# Patient Record
Sex: FEMALE | NOT HISPANIC OR LATINO | Employment: FULL TIME | URBAN - NONMETROPOLITAN AREA
[De-identification: names, ages, dates, MRNs, and addresses within clinical notes are randomized per-mention and may not be internally consistent; named-entity substitution may affect disease eponyms.]

---

## 2019-01-08 ENCOUNTER — OFFICE VISIT CONVERTED (OUTPATIENT)
Dept: FAMILY MEDICINE CLINIC | Age: 44
End: 2019-01-08
Attending: NURSE PRACTITIONER

## 2019-01-16 ENCOUNTER — HOSPITAL ENCOUNTER (OUTPATIENT)
Dept: OTHER | Facility: HOSPITAL | Age: 44
Discharge: HOME | End: 2019-01-16
Attending: NURSE PRACTITIONER

## 2019-01-16 ENCOUNTER — OFFICE VISIT CONVERTED (OUTPATIENT)
Dept: FAMILY MEDICINE CLINIC | Age: 44
End: 2019-01-16
Attending: NURSE PRACTITIONER

## 2019-01-18 LAB
ALP SERPL-CCNC: 91 U/L (ref 42–98)
ASO AB SERPL-ACNC: 194 [IU]/ML (ref 0–200)
CALCIUM SERPL-MCNC: 9.3 MG/DL (ref 8.7–10.4)
CONV ANTI NUCLEAR ANTIBODY WITH REFLEX: POSITIVE
CONV RHEUMATOID FACTOR IGM: <10 [IU]/ML (ref 0–14)
CRP SERPL-MCNC: 4 MG/L (ref 0–5)
ERYTHROCYTE [SEDIMENTATION RATE] IN BLOOD: 6 MM/H (ref 0–20)
PHOSPHATE SERPL-MCNC: 3.4 MG/DL (ref 2.4–4.5)
URATE SERPL-MCNC: 4.3 MG/DL (ref 2.5–7.5)

## 2019-01-21 LAB
CENTROMERE B AB SER-ACNC: <0.2 AI (ref 0–0.9)
CHROMATIN AB: <0.2 AI (ref 0–0.9)
CONV ANTI DOUBLE STRAND DNA  (REFLEX): <1 IU/ML (ref 0–9)
CONV SS-A ANTIBODY (REFLEX): <0.2 AI (ref 0–0.9)
CONV SS-B ANTIBODY (REFLEX): <0.2 AI (ref 0–0.9)
ENA JO1 AB SER IA-ACNC: <0.2 AI (ref 0–0.9)
ENA RNP AB SER-ACNC: <0.2 AI (ref 0–0.9)
ENA SCL70 AB SER QL IA: 3.8 AI (ref 0–0.9)
ENA SM AB SER-ACNC: <0.2 AI (ref 0–0.9)
Lab: ABNORMAL

## 2019-03-08 ENCOUNTER — HOSPITAL ENCOUNTER (OUTPATIENT)
Dept: OTHER | Facility: HOSPITAL | Age: 44
Discharge: HOME | End: 2019-03-08
Attending: INTERNAL MEDICINE

## 2019-03-08 LAB
ERYTHROCYTE [SEDIMENTATION RATE] IN BLOOD: 15 MM/H (ref 0–20)
TSH SERPL-ACNC: 2.11 M[IU]/L (ref 0.27–4.2)

## 2019-03-10 LAB
C3 SERPL-MCNC: 132 MG/DL (ref 88–201)
C4 SERPL-MCNC: 28 MG/DL (ref 10–40)
CONV ANA IGG BY ELISA: NORMAL
RHEUMATOID FACT SERPL-ACNC: <10 IU/ML (ref 0–14)

## 2019-10-11 ENCOUNTER — OFFICE VISIT CONVERTED (OUTPATIENT)
Dept: FAMILY MEDICINE CLINIC | Age: 44
End: 2019-10-11
Attending: NURSE PRACTITIONER

## 2019-10-11 ENCOUNTER — HOSPITAL ENCOUNTER (OUTPATIENT)
Dept: OTHER | Facility: HOSPITAL | Age: 44
Discharge: HOME | End: 2019-10-11
Attending: NURSE PRACTITIONER

## 2019-10-11 LAB
ALBUMIN SERPL-MCNC: 4.2 G/DL (ref 3.5–5)
ALBUMIN/GLOB SERPL: 1.6 {RATIO} (ref 1.4–2.6)
ALP SERPL-CCNC: 90 U/L (ref 42–98)
ALT SERPL-CCNC: 18 U/L (ref 10–40)
ANION GAP SERPL CALC-SCNC: 18 MMOL/L (ref 8–19)
AST SERPL-CCNC: 16 U/L (ref 15–50)
BASOPHILS # BLD MANUAL: 0.03 10*3/UL (ref 0–0.2)
BASOPHILS NFR BLD MANUAL: 0.4 % (ref 0–3)
BILIRUB SERPL-MCNC: 0.85 MG/DL (ref 0.2–1.3)
BUN SERPL-MCNC: 14 MG/DL (ref 5–25)
BUN/CREAT SERPL: 16 {RATIO} (ref 6–20)
CALCIUM SERPL-MCNC: 9.2 MG/DL (ref 8.7–10.4)
CHLORIDE SERPL-SCNC: 103 MMOL/L (ref 99–111)
CONV CO2: 23 MMOL/L (ref 22–32)
CONV TOTAL PROTEIN: 6.8 G/DL (ref 6.3–8.2)
CREAT UR-MCNC: 0.85 MG/DL (ref 0.5–0.9)
DEPRECATED RDW RBC AUTO: 41.9 FL
EOSINOPHIL # BLD MANUAL: 0.12 10*3/UL (ref 0–0.7)
EOSINOPHIL NFR BLD MANUAL: 1.5 % (ref 0–7)
ERYTHROCYTE [DISTWIDTH] IN BLOOD BY AUTOMATED COUNT: 13.1 % (ref 11.5–14.5)
GFR SERPLBLD BASED ON 1.73 SQ M-ARVRAT: >60 ML/MIN/{1.73_M2}
GLOBULIN UR ELPH-MCNC: 2.6 G/DL (ref 2–3.5)
GLUCOSE SERPL-MCNC: 100 MG/DL (ref 65–99)
GRANS (ABSOLUTE): 5.14 10*3/UL (ref 2–8)
GRANS: 62.5 % (ref 30–85)
HBA1C MFR BLD: 13 G/DL (ref 12–16)
HCT VFR BLD AUTO: 39.8 % (ref 37–47)
IMM GRANULOCYTES # BLD: 0.01 10*3/UL (ref 0–0.54)
IMM GRANULOCYTES NFR BLD: 0.1 % (ref 0–0.43)
LYMPHOCYTES # BLD MANUAL: 2.47 10*3/UL (ref 1–5)
LYMPHOCYTES NFR BLD MANUAL: 5.4 % (ref 3–10)
MCH RBC QN AUTO: 28.3 PG (ref 27–31)
MCHC RBC AUTO-ENTMCNC: 32.7 G/DL (ref 33–37)
MCV RBC AUTO: 86.5 FL (ref 81–99)
MONOCYTES # BLD AUTO: 0.44 10*3/UL (ref 0.2–1.2)
OSMOLALITY SERPL CALC.SUM OF ELEC: 291 MOSM/KG (ref 273–304)
PLATELET # BLD AUTO: 273 10*3/UL (ref 130–400)
PMV BLD AUTO: 9 FL (ref 7.4–10.4)
POTASSIUM SERPL-SCNC: 4.2 MMOL/L (ref 3.5–5.3)
RBC # BLD AUTO: 4.6 10*6/UL (ref 4.2–5.4)
SODIUM SERPL-SCNC: 140 MMOL/L (ref 135–147)
VARIANT LYMPHS NFR BLD MANUAL: 30.1 % (ref 20–45)
WBC # BLD AUTO: 8.21 10*3/UL (ref 4.8–10.8)

## 2019-11-21 ENCOUNTER — HOSPITAL ENCOUNTER (OUTPATIENT)
Dept: OTHER | Facility: HOSPITAL | Age: 44
Discharge: HOME | End: 2019-11-21
Attending: NURSE PRACTITIONER

## 2019-11-21 LAB
CHOLEST SERPL-MCNC: 201 MG/DL (ref 107–200)
CHOLEST/HDLC SERPL: 4.9 {RATIO} (ref 3–6)
GLUCOSE SERPL-MCNC: 99 MG/DL (ref 65–115)
HDLC SERPL-MCNC: 41 MG/DL (ref 40–60)
LDLC SERPL CALC-MCNC: 135 MG/DL (ref 70–100)
TRIGL SERPL-MCNC: 127 MG/DL (ref 40–150)
VLDLC SERPL-MCNC: 25 MG/DL (ref 5–37)

## 2020-01-14 ENCOUNTER — OFFICE VISIT CONVERTED (OUTPATIENT)
Dept: FAMILY MEDICINE CLINIC | Age: 45
End: 2020-01-14
Attending: NURSE PRACTITIONER

## 2020-05-28 ENCOUNTER — OFFICE VISIT CONVERTED (OUTPATIENT)
Dept: FAMILY MEDICINE CLINIC | Age: 45
End: 2020-05-28
Attending: FAMILY MEDICINE

## 2020-05-28 ENCOUNTER — HOSPITAL ENCOUNTER (OUTPATIENT)
Dept: OTHER | Facility: HOSPITAL | Age: 45
Discharge: HOME | End: 2020-05-28
Attending: FAMILY MEDICINE

## 2020-05-28 LAB
ALBUMIN SERPL-MCNC: 4.2 G/DL (ref 3.5–5)
ALBUMIN/GLOB SERPL: 1.5 {RATIO} (ref 1.4–2.6)
ALP SERPL-CCNC: 99 U/L (ref 42–98)
ALT SERPL-CCNC: 16 U/L (ref 10–40)
ANION GAP SERPL CALC-SCNC: 15 MMOL/L (ref 8–19)
APPEARANCE UR: CLEAR
AST SERPL-CCNC: 16 U/L (ref 15–50)
BACTERIA UR QL AUTO: ABNORMAL
BILIRUB SERPL-MCNC: 1.19 MG/DL (ref 0.2–1.3)
BILIRUB UR QL: NEGATIVE
BUN SERPL-MCNC: 10 MG/DL (ref 5–25)
BUN/CREAT SERPL: 11 {RATIO} (ref 6–20)
CALCIUM SERPL-MCNC: 9.2 MG/DL (ref 8.7–10.4)
CASTS URNS QL MICRO: ABNORMAL /[LPF]
CHLORIDE SERPL-SCNC: 105 MMOL/L (ref 99–111)
COLOR UR: ABNORMAL
CONV CO2: 23 MMOL/L (ref 22–32)
CONV LEUKOCYTE ESTERASE: NEGATIVE
CONV OTHER ELEMENTS IN URINE SEDIMENT BY LIGHT MICROSCOPY: ABNORMAL
CONV TOTAL PROTEIN: 7 G/DL (ref 6.3–8.2)
CONV UROBILINOGEN IN URINE BY AUTOMATED TEST STRIP: 0.2 {EHRLICHU}/DL (ref 0.1–1)
CREAT UR-MCNC: 0.88 MG/DL (ref 0.5–0.9)
D DIMER PPP FEU-MCNC: 0.27 MG/L (ref 0–0.59)
EPI CELLS #/AREA URNS HPF: ABNORMAL /[HPF]
GFR SERPLBLD BASED ON 1.73 SQ M-ARVRAT: >60 ML/MIN/{1.73_M2}
GLOBULIN UR ELPH-MCNC: 2.8 G/DL (ref 2–3.5)
GLUCOSE 24H UR-MCNC: NEGATIVE MG/DL
GLUCOSE SERPL-MCNC: 104 MG/DL (ref 65–99)
HGB UR QL STRIP: NEGATIVE
KETONES UR QL STRIP: NEGATIVE MG/DL
MAGNESIUM SERPL-MCNC: 2.08 MG/DL (ref 1.6–2.3)
MUCOUS THREADS URNS QL MICRO: ABNORMAL
NITRITE UR-MCNC: NEGATIVE MG/ML
OSMOLALITY SERPL CALC.SUM OF ELEC: 287 MOSM/KG (ref 273–304)
PH UR STRIP.AUTO: 5.5 [PH] (ref 5–8)
POTASSIUM SERPL-SCNC: 3.9 MMOL/L (ref 3.5–5.3)
PROT UR-MCNC: NEGATIVE MG/DL
RBC # BLD AUTO: ABNORMAL /[HPF]
SODIUM SERPL-SCNC: 139 MMOL/L (ref 135–147)
SP GR UR STRIP: >=1.03 (ref 1–1.03)
SPECIMEN SOURCE: ABNORMAL
TSH SERPL-ACNC: 3.45 M[IU]/L (ref 0.27–4.2)
UNIDENT CRYS URNS QL MICRO: ABNORMAL /[HPF]
WBC #/AREA URNS HPF: ABNORMAL /[HPF]

## 2020-06-24 ENCOUNTER — CONVERSION ENCOUNTER (OUTPATIENT)
Dept: CARDIOLOGY | Facility: CLINIC | Age: 45
End: 2020-06-24
Attending: INTERNAL MEDICINE

## 2020-07-10 ENCOUNTER — HOSPITAL ENCOUNTER (OUTPATIENT)
Dept: OTHER | Facility: HOSPITAL | Age: 45
Discharge: HOME | End: 2020-07-10
Attending: NURSE PRACTITIONER

## 2020-07-10 ENCOUNTER — OFFICE VISIT CONVERTED (OUTPATIENT)
Dept: FAMILY MEDICINE CLINIC | Age: 45
End: 2020-07-10
Attending: NURSE PRACTITIONER

## 2020-07-10 LAB
APTT BLD: 22.4 S (ref 22.2–34.2)
BASOPHILS # BLD MANUAL: 0.03 10*3/UL (ref 0–0.2)
BASOPHILS NFR BLD MANUAL: 0.3 % (ref 0–3)
CHOLEST SERPL-MCNC: 197 MG/DL (ref 107–200)
CHOLEST/HDLC SERPL: 5.5 {RATIO} (ref 3–6)
DEPRECATED RDW RBC AUTO: 40.8 FL
EOSINOPHIL # BLD MANUAL: 0.1 10*3/UL (ref 0–0.7)
EOSINOPHIL NFR BLD MANUAL: 1.1 % (ref 0–7)
ERYTHROCYTE [DISTWIDTH] IN BLOOD BY AUTOMATED COUNT: 13.1 % (ref 11.5–14.5)
GRANS (ABSOLUTE): 5.58 10*3/UL (ref 2–8)
GRANS: 63.5 % (ref 30–85)
HBA1C MFR BLD: 12.9 G/DL (ref 12–16)
HCT VFR BLD AUTO: 38.9 % (ref 37–47)
HDLC SERPL-MCNC: 36 MG/DL (ref 40–60)
IMM GRANULOCYTES # BLD: 0.01 10*3/UL (ref 0–0.54)
IMM GRANULOCYTES NFR BLD: 0.1 % (ref 0–0.43)
INR PPP: 0.86 (ref 2–3)
LDLC SERPL CALC-MCNC: 138 MG/DL (ref 70–100)
LYMPHOCYTES # BLD MANUAL: 2.58 10*3/UL (ref 1–5)
LYMPHOCYTES NFR BLD MANUAL: 5.7 % (ref 3–10)
MCH RBC QN AUTO: 28.3 PG (ref 27–31)
MCHC RBC AUTO-ENTMCNC: 33.2 G/DL (ref 33–37)
MCV RBC AUTO: 85.3 FL (ref 81–99)
MONOCYTES # BLD AUTO: 0.5 10*3/UL (ref 0.2–1.2)
PLATELET # BLD AUTO: 277 10*3/UL (ref 130–400)
PMV BLD AUTO: 9.2 FL (ref 7.4–10.4)
PROTHROMBIN TIME: 9.6 S (ref 9.4–12)
RBC # BLD AUTO: 4.56 10*6/UL (ref 4.2–5.4)
TRIGL SERPL-MCNC: 116 MG/DL (ref 40–150)
VARIANT LYMPHS NFR BLD MANUAL: 29.3 % (ref 20–45)
VLDLC SERPL-MCNC: 23 MG/DL (ref 5–37)
WBC # BLD AUTO: 8.8 10*3/UL (ref 4.8–10.8)

## 2020-07-27 ENCOUNTER — HOSPITAL ENCOUNTER (OUTPATIENT)
Dept: OTHER | Facility: HOSPITAL | Age: 45
Discharge: HOME | End: 2020-07-27
Attending: INTERNAL MEDICINE

## 2020-07-27 LAB
BASOPHILS # BLD MANUAL: 0.06 10*3/UL (ref 0–0.2)
BASOPHILS NFR BLD MANUAL: 0.5 % (ref 0–3)
DEPRECATED RDW RBC AUTO: 39.4 FL
EOSINOPHIL # BLD MANUAL: 0.11 10*3/UL (ref 0–0.7)
EOSINOPHIL NFR BLD MANUAL: 1 % (ref 0–7)
ERYTHROCYTE [DISTWIDTH] IN BLOOD BY AUTOMATED COUNT: 12.7 % (ref 11.5–14.5)
GRANS (ABSOLUTE): 7.42 10*3/UL (ref 2–8)
GRANS: 65.2 % (ref 30–85)
HBA1C MFR BLD: 12.4 G/DL (ref 12–16)
HCT VFR BLD AUTO: 37 % (ref 37–47)
IMM GRANULOCYTES # BLD: 0.02 10*3/UL (ref 0–0.54)
IMM GRANULOCYTES NFR BLD: 0.2 % (ref 0–0.43)
LYMPHOCYTES # BLD MANUAL: 3.1 10*3/UL (ref 1–5)
LYMPHOCYTES NFR BLD MANUAL: 5.9 % (ref 3–10)
MCH RBC QN AUTO: 28.4 PG (ref 27–31)
MCHC RBC AUTO-ENTMCNC: 33.5 G/DL (ref 33–37)
MCV RBC AUTO: 84.9 FL (ref 81–99)
MONOCYTES # BLD AUTO: 0.67 10*3/UL (ref 0.2–1.2)
PLATELET # BLD AUTO: 314 10*3/UL (ref 130–400)
PMV BLD AUTO: 9 FL (ref 7.4–10.4)
RBC # BLD AUTO: 4.36 10*6/UL (ref 4.2–5.4)
T4 FREE SERPL-MCNC: 1 NG/DL (ref 0.9–1.8)
TSH SERPL-ACNC: 5.03 M[IU]/L (ref 0.27–4.2)
VARIANT LYMPHS NFR BLD MANUAL: 27.2 % (ref 20–45)
WBC # BLD AUTO: 11.38 10*3/UL (ref 4.8–10.8)

## 2020-07-28 LAB
ANION GAP SERPL CALC-SCNC: 16 MMOL/L (ref 8–19)
BUN SERPL-MCNC: 14 MG/DL (ref 5–25)
BUN/CREAT SERPL: 15 {RATIO} (ref 6–20)
CALCIUM SERPL-MCNC: 9.7 MG/DL (ref 8.7–10.4)
CHLORIDE SERPL-SCNC: 102 MMOL/L (ref 99–111)
CONV CO2: 24 MMOL/L (ref 22–32)
CREAT UR-MCNC: 0.92 MG/DL (ref 0.5–0.9)
GFR SERPLBLD BASED ON 1.73 SQ M-ARVRAT: >60 ML/MIN/{1.73_M2}
GLUCOSE SERPL-MCNC: 87 MG/DL (ref 65–99)
OSMOLALITY SERPL CALC.SUM OF ELEC: 286 MOSM/KG (ref 273–304)
POTASSIUM SERPL-SCNC: 4 MMOL/L (ref 3.5–5.3)
SODIUM SERPL-SCNC: 138 MMOL/L (ref 135–147)

## 2021-01-04 ENCOUNTER — HOSPITAL ENCOUNTER (OUTPATIENT)
Dept: OTHER | Facility: HOSPITAL | Age: 46
Discharge: HOME | End: 2021-01-04
Attending: INTERNAL MEDICINE

## 2021-01-04 LAB — THEOPHYLLINE SERPL-MCNC: 4.3 UG/ML (ref 10–20)

## 2021-01-19 ENCOUNTER — HOSPITAL ENCOUNTER (OUTPATIENT)
Dept: OTHER | Facility: HOSPITAL | Age: 46
Discharge: HOME | End: 2021-01-19
Attending: INTERNAL MEDICINE

## 2021-01-20 LAB — THEOPHYLLINE SERPL-MCNC: 6 UG/ML (ref 10–20)

## 2021-02-12 ENCOUNTER — OFFICE VISIT CONVERTED (OUTPATIENT)
Dept: FAMILY MEDICINE CLINIC | Age: 46
End: 2021-02-12
Attending: NURSE PRACTITIONER

## 2021-02-13 ENCOUNTER — HOSPITAL ENCOUNTER (OUTPATIENT)
Dept: OTHER | Facility: HOSPITAL | Age: 46
Discharge: HOME | End: 2021-02-13
Attending: NURSE PRACTITIONER

## 2021-02-13 LAB
25(OH)D3 SERPL-MCNC: 25.7 NG/ML (ref 30–100)
ALBUMIN SERPL-MCNC: 3.8 G/DL (ref 3.5–5)
ALBUMIN/GLOB SERPL: 1.3 {RATIO} (ref 1.4–2.6)
ALP SERPL-CCNC: 118 U/L (ref 42–98)
ALT SERPL-CCNC: 12 U/L (ref 10–40)
ANION GAP SERPL CALC-SCNC: 12 MMOL/L (ref 8–19)
AST SERPL-CCNC: 11 U/L (ref 15–50)
BILIRUB SERPL-MCNC: 0.43 MG/DL (ref 0.2–1.3)
BUN SERPL-MCNC: 9 MG/DL (ref 5–25)
BUN/CREAT SERPL: 11 {RATIO} (ref 6–20)
CALCIUM SERPL-MCNC: 9.2 MG/DL (ref 8.7–10.4)
CHLORIDE SERPL-SCNC: 107 MMOL/L (ref 99–111)
CHOLEST SERPL-MCNC: 170 MG/DL (ref 107–200)
CHOLEST/HDLC SERPL: 4 {RATIO} (ref 3–6)
CONV CO2: 24 MMOL/L (ref 22–32)
CONV TOTAL PROTEIN: 6.8 G/DL (ref 6.3–8.2)
CREAT UR-MCNC: 0.84 MG/DL (ref 0.5–0.9)
GFR SERPLBLD BASED ON 1.73 SQ M-ARVRAT: >60 ML/MIN/{1.73_M2}
GLOBULIN UR ELPH-MCNC: 3 G/DL (ref 2–3.5)
GLUCOSE SERPL-MCNC: 77 MG/DL (ref 65–99)
HDLC SERPL-MCNC: 42 MG/DL (ref 40–60)
LDLC SERPL CALC-MCNC: 108 MG/DL (ref 70–100)
OSMOLALITY SERPL CALC.SUM OF ELEC: 285 MOSM/KG (ref 273–304)
POTASSIUM SERPL-SCNC: 3.9 MMOL/L (ref 3.5–5.3)
SODIUM SERPL-SCNC: 139 MMOL/L (ref 135–147)
TRIGL SERPL-MCNC: 101 MG/DL (ref 40–150)
TSH SERPL-ACNC: 2.84 M[IU]/L (ref 0.27–4.2)
VIT B12 SERPL-MCNC: 337 PG/ML (ref 211–911)
VLDLC SERPL-MCNC: 20 MG/DL (ref 5–37)

## 2021-04-14 ENCOUNTER — HOSPITAL ENCOUNTER (OUTPATIENT)
Dept: OTHER | Facility: HOSPITAL | Age: 46
Discharge: HOME | End: 2021-04-14
Attending: NURSE PRACTITIONER

## 2021-04-14 LAB
ALBUMIN SERPL-MCNC: 3.7 G/DL (ref 3.5–5)
ALBUMIN/GLOB SERPL: 1.3 {RATIO} (ref 1.4–2.6)
ALP SERPL-CCNC: 105 U/L (ref 42–98)
ALT SERPL-CCNC: 13 U/L (ref 10–40)
ANION GAP SERPL CALC-SCNC: 19 MMOL/L (ref 8–19)
AST SERPL-CCNC: 11 U/L (ref 15–50)
BILIRUB SERPL-MCNC: 0.29 MG/DL (ref 0.2–1.3)
BUN SERPL-MCNC: 14 MG/DL (ref 5–25)
BUN/CREAT SERPL: 15 {RATIO} (ref 6–20)
CALCIUM SERPL-MCNC: 9.5 MG/DL (ref 8.7–10.4)
CHLORIDE SERPL-SCNC: 104 MMOL/L (ref 99–111)
CONV CO2: 21 MMOL/L (ref 22–32)
CONV TOTAL PROTEIN: 6.5 G/DL (ref 6.3–8.2)
CREAT UR-MCNC: 0.93 MG/DL (ref 0.5–0.9)
GFR SERPLBLD BASED ON 1.73 SQ M-ARVRAT: >60 ML/MIN/{1.73_M2}
GLOBULIN UR ELPH-MCNC: 2.8 G/DL (ref 2–3.5)
GLUCOSE SERPL-MCNC: 132 MG/DL (ref 65–99)
OSMOLALITY SERPL CALC.SUM OF ELEC: 292 MOSM/KG (ref 273–304)
POTASSIUM SERPL-SCNC: 3.6 MMOL/L (ref 3.5–5.3)
SODIUM SERPL-SCNC: 140 MMOL/L (ref 135–147)

## 2021-04-15 LAB — 25(OH)D3 SERPL-MCNC: 27.6 NG/ML (ref 30–100)

## 2021-04-24 ENCOUNTER — OFFICE VISIT CONVERTED (OUTPATIENT)
Dept: FAMILY MEDICINE CLINIC | Age: 46
End: 2021-04-24
Attending: FAMILY MEDICINE

## 2021-05-05 ENCOUNTER — HOSPITAL ENCOUNTER (OUTPATIENT)
Dept: OTHER | Facility: HOSPITAL | Age: 46
Discharge: HOME | End: 2021-05-05
Attending: NURSE PRACTITIONER

## 2021-05-05 LAB
EST. AVERAGE GLUCOSE BLD GHB EST-MCNC: 114 MG/DL
GLUCOSE SERPL-MCNC: 110 MG/DL (ref 65–115)
HBA1C MFR BLD: 5.6 % (ref 3.5–5.7)

## 2021-05-10 NOTE — PROCEDURES
"   Procedure Note      Patient Name: Enedelia Lombardi   Patient ID: [id number]   Sex: Female   YOB: 1975    Primary Care Provider: Yoni Solorzano MD   Referring Provider: Yoni Solorzano MD    Visit Date: June 24, 2020    Provider: Pancho Rivera MD   Location: [facility]   Location Address: [address] [place] [address]  [address] [place] [address]  [id number]   Location Phone: [phone]          FINAL REPORT   TRANSTHORACIC ECHOCARDIOGRAM REPORT    Diagnosis: Shortness of breath   Height: 5'9\" Weight: 260 B/P: BLOOD PRESSURE BSA: 2.31   Tech: [facility]   MEASUREMENTS:  RVID (Diastole) : RVID. (NORMAL: 0.7 to 2.4 cm max)   LVID (Systole): 3.3 cm (Diastole): 4.8 cm . (NORMAL: 3.7 - 5.4 cm)   Posterior Wall Thickness (Diastole): 1 cm. (NORMAL: 0.8 - 1.1 cm)   Septal Thickness (Diastole): 1.2 cm. (NORMAL: 0.7 - 1.2 cm)   LAID (Systole): 3.3 cm. (NORMAL: 1.9 - 3.8 cm)   Aortic Root Diameter (Diastole): 2.8 cm. (NORMAL: 2.0 - 3.7 cm)   COMMENTS:  The patient underwent 2-D, M-Mode, and Doppler examination, including pulse-wave, continuous-wave, and color-flow Doppler analysis; the study is technically adequate. The following findings were noted:   FINDINGS:  MITRAL VALVE: Mitral valve leaflets appear normal and open well. No evidence of mitral valve prolapse or mitral stenosis. Trace mitral regurgitation.   AORTIC VALVE: Normal trileaflet aortic valve which opens well. No evidence of aortic stenosis or aortic regurgitation.   TRICUSPID VALVE: Normal valve morphology, opens well. Trace tricuspid regurgitation. Estimated pulmonary artery systolic pressure was within normal limits.   PULMONIC VALVE: Grossly normal. No pulmonic stenosis. Trace pulmonic regurgitation.   AORTIC ROOT: Normal in size with adequate motion.   LEFT ATRIUM: Normal left atrial size. LA volume index 26 mL/m2.   LEFT VENTRICLE: Normal left ventricular size. Borderline concentric left ventricular hypertrophy. Normal left " ventricular systolic function with an estimated ejection fraction of 60% and normal left ventricular wall motion throughout. No left ventricular thrombus or mass visualized. Normal diastolic filling pattern. Tissues Doppler findings were consistent with normal left ventricular filling pressure.   RIGHT VENTRICLE: Normal right ventricular size and systolic function.   RIGHT ATRIUM: Normal right atrial size.   PERICARDIUM: No pericardial effusion.   INFERIOR VENA CAVA: Normal IVC size and respirophasic changes.   DOPPLER: E/A ratio is 1.4. DT= 229 msec. IVRT is 144 msec. E/E' is 8.   Faxed: 07/06/2020      CONCLUSION:  1.  Normal left ventricular systolic function with an estimated ejection fraction of 60% and normal left        ventricular wall motion throughout.  Normal diastolic function.  Borderline concentric left ventricular        hypertrophy.   2.  No hemodynamically significant valvular disease.        MD FRANKLIN Diehl/martha      This note was transcribed by Tiffany Sainz.  martha/franklin  The above service was transcribed by Tiffany Sainz, and I attest to the accuracy of the note.  BAP                 Electronically Signed by: Sridevi Sainz-, Other -Author on July 6, 2020 11:55:51 AM  Electronically Co-signed by: Pancho Rivera MD -Reviewer on July 6, 2020 08:03:34 PM

## 2021-05-18 NOTE — PROGRESS NOTES
Enedelia Lombardi  1975     Office/Outpatient Visit    Visit Date: Sat 08:57 am    Provider: David Sandoval MD (Assistant: Smiley Duque MA)    Location: [facility]        Electronically signed by David Sandoval MD on  2021 03:50:42 PM                             Subjective:        CC: Mrs. Lombardi is a 45 year old White female.  Patient presents today for ER follow up - [place] 2021 due to L leg swelling;         HPI:       (Improving)     Complaint of pain in left leg..  The symptom began 1 week ago.  The severity is of moderate intensity.  NEG U/S IN ER.  STARTED ABX, IMPROVING.      ROS:     CONSTITUTIONAL:  Negative for chills and fever.      CARDIOVASCULAR:  Negative for chest pain and palpitations.      RESPIRATORY:  Negative for recent cough and dyspnea.      GASTROINTESTINAL:  Negative for abdominal pain, nausea and vomiting.      MUSCULOSKELETAL:  Negative for myalgias.      INTEGUMENTARY/BREAST:  Negative for rash.      NEUROLOGICAL:  Negative for paresthesias and weakness.          Past Medical History / Family History / Social History:         Last Reviewed on 2021 01:03 PM by David Sandoval    Past Medical History: dr china grande is PCP                 PAST MEDICAL HISTORY         Hypertension: dx'd at age age 18s;     Sleep Apnea: dx'd in aug 2020; (+) sleep study;         CURRENT MEDICAL PROVIDERS:    Orthopedist    Rheumatologist         PREVENTIVE HEALTH MAINTENANCE             EYE EXAM: was last done      MAMMOGRAM: was last done 2019 with normal results     PAP SMEAR: was last done 2018 with normal results         Surgical History:     Other Surgeries     section: X 1;     Bilateral Tubal Ligation    kidney reconstruction age 7;         Family History:         Positive for Colon Cancer ( father -- age 65 ) and uterine cancer mother.      Positive for Type 2 Diabetes ( pat. aunt ).          Social History:      Occupation: [employer]     Marital Status:      Children: 1 child         Tobacco/Alcohol/Supplements:     Last Reviewed on 4/24/2021 01:03 PM by David Sandoval    Tobacco: She has a past history of cigarette smoking; quit date:  2007.          Substance Abuse History:     Last Reviewed on 5/28/2020 03:03 PM by Yoni Solorzano        Mental Health History:     Last Reviewed on 5/28/2020 03:03 PM by Yoni Solorzano        Communicable Diseases (eg STDs):     Last Reviewed on 5/28/2020 03:03 PM by Yoni Solorzano        Current Problems:     Last Reviewed on 4/24/2021 01:03 PM by David Sandoval    Essential (primary) hypertension    Gastro-esophageal reflux disease without esophagitis    Pain in left leg    Irregular menstruation, unspecified    Palpitations    Vitamin D deficiency, unspecified    Hyperglycemia, unspecified        Immunizations:     influenza, injectable, quadrivalent 10/30/2020    Havrix -adult dose (HepA) 1/16/2019    Hep A, adult dose 4/24/2018    Fluzone Quadrivalent (3+ years) 11/8/2018    Fluzone Quadrivalent (3+ years) 10/1/2019    Boostrix (Tdap) 1/16/2019    SARS-COV-2 (COVID-19) vaccine, UNSPECIFIED 3/13/2021        Allergies:     Last Reviewed on 4/24/2021 01:03 PM by David Sandoval    No Known Allergies.        Current Medications:     Last Reviewed on 4/24/2021 01:03 PM by David Sandoval    bisoproloL-hydrochlorothiazide 10-6.25 mg oral tablet [TAKE 1 TABLET BY MOUTH EVERY DAY]    MONTELUKAST SOD 10 MG TABLET     SYMBICORT 160-4.5 MCG INHALER     THEOPHYLLINE  MG TABLET     CEPHALEXIN 500 MG CAPSULE        Objective:        Vitals:         Current: 4/24/2021 9:02:37 AM    Ht:  5 ft, 7 in;  Wt: 260.6 lbs;  BMI: 40.8T: 98 F (temporal);  BP: 130/68 mm Hg (left arm, sitting);  P: 65 bpm (left arm (BP Cuff), sitting);  sCr: 0.93 mg/dL;  GFR: 96.71        Exams:     PHYSICAL EXAM:     GENERAL: vital signs recorded - well  developed, well nourished;  no apparent distress;     RESPIRATORY: normal respiratory rate and pattern with no distress; normal breath sounds with no rales, rhonchi, wheezes or rubs;     CARDIOVASCULAR: normal rate; rhythm is regular;  no systolic murmur;     NEUROLOGIC: GROSSLY INTACT LEFT ANKLE examination:     Inspection: edema; CALF S/W SWOLLEN/TENDER;     Palpation: S/W TENDER;     Neurovascular: normal;     Muscular Strength: normal;     Range of Motion: full active ROM;     Maneuvers: (-) Layla's sign;             Lab/Test Results:         Glucose, Serum: 132 (mg/dL) (04/14/2021),             Assessment:         M79.605   Pain in left leg   (Improving)     R73.9   Hyperglycemia, unspecified           Plan:         Pain in left leg        RECOMMENDATIONS given include: ELEVATION AND HEAT and FINISH MEDS.      FOLLOW-UP: Schedule follow-up appointments on a p.r.n. basis. Schedule a follow-up visit in 1 month. WITH PCP     POSSIBLE CELLULITIS, ER NOTES FROM 4/21 REVIEWED Consider further workup Observe as improved         Hyperglycemia, unspecified        WILL ADDRESS WITH PCP             Patient Recommendations:        For  Pain in left leg:    Schedule follow-up appointments as needed. Schedule a follow-up visit in 1 month.  I also recommend POSSIBLE CELLULITIS, ER NOTES FROM 4/21 REVIEWED.          For  Hyperglycemia, unspecified:    I also recommend WILL ADDRESS WITH PCP.              Charge Capture:         Primary Diagnosis:     M79.605  Pain in left leg           Orders:      20362  Office/outpatient visit; established patient, level 3  (In-House)              R73.9  Hyperglycemia, unspecified

## 2021-05-18 NOTE — PROGRESS NOTES
Enedelia Mcadams 1975     Office/Outpatient Visit    Visit Date: Wed, Jan 16, 2019 02:10 pm    Provider: Lindsey Beltran N.P. (Assistant: Sarah Spurling, MA)    Location: [facility]        Electronically signed by Lindsey Beltran N.P. on  01/21/2019 08:59:40 AM                             SUBJECTIVE:        CC:     Ms. Mcadams is a 43 year old White female.  Med refills and she wanted to see if she could get the vaccines she needs.;         HPI:         Ms. Mcadams presents with hypertension.  This was first diagnosed 20 years ago.  Her current cardiac medication regimen includes bisoprolol/ hctz.  She did not bring her blood pressure diary, but says that pressures have been well controlled.  She is tolerating the medication well without side effects.  Compliance with treatment has been good; she takes her medication as directed.          Additionally, she presents with history of diffuse arthralgia.  the patient notes diffuse joint pain.  She describes the discomfort as mild.  Symptoms have been stable and nonprogressive.  Primary joints affected include hands,  hips, and knees.  The patient has tried ibuprofen with some relief.      ROS:     CONSTITUTIONAL:  Negative for chills, fatigue, fever, and weight change.      CARDIOVASCULAR:  Negative for chest pain, palpitations, tachycardia, orthopnea, and edema.      RESPIRATORY:  Negative for cough, dyspnea, and hemoptysis.      GASTROINTESTINAL:  Negative for abdominal pain, heartburn, constipation, diarrhea, and stool changes.      MUSCULOSKELETAL:  Positive for arthralgias.   Negative for myalgias.      NEUROLOGICAL:  Negative for dizziness, headaches, paresthesias, and weakness.      PSYCHIATRIC:  Negative for anxiety, depression, and sleep disturbances.          PMH/FMH/SH:     Last Reviewed on 1/08/2019 02:45 PM by Lindsey Beltran    Past Medical History: dr china grande is PCP                 PAST MEDICAL HISTORY         Hypertension: dx'd at  age age 18s;         Surgical History:     Other Surgeries     section: X 1;     Bilateral Tubal Ligation    kidney reconstruction age 7;         Family History:         Positive for Colon Cancer ( father -- age 65 ) and uterine cancer mother.      Positive for Type 2 Diabetes ( pat. aunt ).          Social History:     Occupation: [email or web address]     Marital Status:      Children: 1 child         Tobacco/Alcohol/Supplements:     Last Reviewed on 2019 01:59 PM by Bartolo Tatum    Tobacco: She has a past history of cigarette smoking; quit date:  .              Current Problems:     Screening for depression         Immunizations:     Fluzone Quadrivalent (3+ years) 2018         Allergies:     Last Reviewed on 2019 01:59 PM by Bartolo Tatum      No Known Drug Allergies.         Current Medications:     Last Reviewed on 2019 02:18 PM by Spurling, Sarah C    Bisoprolol/Hydrochlorthiazide 5mg/6.25mg Tablet Take 1 tablet(s) by mouth daily     Escitalopram Oxalate 10mg Tablet 1 tab daily     Topiramate 50mg Capsules, Extended Release Take 1 cap by mouth daily     B 12 Vitamin 500mcg     Trazodone HCl 100mg Tablet 1-2 tablets nightly as needed     Vitamin D3 5,000IU Capsules 1 capsule every day         OBJECTIVE:        Vitals:         Historical:     2019  BP:   138/63 mm Hg ( (right arm, , sitting, );)     2019  Wt:   250lbs        Current: 2019 2:20:23 PM    Ht:  5 ft, 7 in;  Wt: 246.8 lbs;  BMI: 38.7    T: 98.3 F;  BP: 143/73 mm Hg (right arm, sitting);  P: 77 bpm (right arm (BP Cuff), sitting)        Exams:     PHYSICAL EXAM:     GENERAL: no apparent distress;     RESPIRATORY: normal respiratory rate and pattern with no distress; normal breath sounds with no rales, rhonchi, wheezes or rubs;     CARDIOVASCULAR: normal rate; rhythm is regular;     Peripheral Pulses: posterior tibial: 2+ amplitude, no bruits; no edema;     MUSCULOSKELETAL:  Normal range of motion, strength  and tone;     NEUROLOGIC: mental status: alert and oriented x 3; GROSSLY INTACT     PSYCHIATRIC:  appropriate affect and demeanor; normal speech pattern; grossly normal memory;         Procedures:     Vaccination against hepatitis A     1. Hepatitis A (adult): 1.0 ml given IM in the left upper arm; administered by [facility]         Tdap vaccination     1. Boostrix (Tdap): 0.5 ml unit dose given IM in the right upper arm; administered by [facility]             ASSESSMENT           401.1   I10  Hypertension              DDx:     V05.3   Z23  Vaccination against hepatitis A              DDx:     V06.1   Z23  Tdap vaccination              DDx:     719.49   M25.552   M25.551   M25.541   M25.542  Diffuse arthralgia              DDx:         ORDERS:         Meds Prescribed:       Refill of: Bisoprolol/Hydrochlorthiazide 5mg/6.25mg Tablet Take 1 tablet(s) by mouth daily  #30 (Thirty) tablet(s) Refills: 0         Lab Orders:       11066  [facility] Arthritis Profile  (Send-Out)           Procedures Ordered:       99918  HepA vaccine adult dose for intramuscular use  (In-House)         85592  Tdap vaccine, when administered to individuals 7 years or older, for intramuscular use  (In-House)         16666  Immunization administration; one vaccine  (In-House)         55773  Immunization administration; each additional vaccine/toxoid  (In-House)                   PLAN:          Hypertension [phone] [facility].  needs 30 day supply to [facility] and then 90 day supply to [facility]         RECOMMENDATIONS given include: avoid pseudoephedrine or other stimulants/decongestants in common cold remedies, perform routine monitoring of blood pressure with home blood pressure cuff, exercise, reduction of dietary salt intake, take medication as prescribed, try not to miss doses, and Advised about free BP checks on the last Saturday of each month.            Prescriptions:       Refill of: Bisoprolol/Hydrochlorthiazide 5mg/6.25mg Tablet  Take 1 tablet(s) by mouth daily  #30 (Thirty) tablet(s) Refills: 0          Vaccination against hepatitis A           Orders:       00421  HepA vaccine adult dose for intramuscular use  (In-House)         85373  Immunization administration; each additional vaccine/toxoid  (In-House)            Tdap vaccination           Orders:       92728  Tdap vaccine, when administered to individuals 7 years or older, for intramuscular use  (In-House)         72617  Immunization administration; one vaccine  (In-House)            Diffuse arthralgia     LABORATORY:  Labs ordered to be performed today include Arthritis Profile.      RECOMMENDATIONS given include: labs due to bilateral symptom presentation .  she cannot recall if arhtritis panel has ever been done.  consider xrays of affected areas..            Orders:       98901  [facility] Arthritis Profile  (Send-Out)               Patient Recommendations:        For  Hypertension:     Certain decongestants and stimulants (like pseudoephedrine) in common cold remedies raise blood pressure, sometimes to dangerously high levels. Never take with MAO inhibitors! Begin monitoring your blood pressure by brief nurse visits at our office, a home blood pressure monitor, or by checking on the machines in pharmacies or stores.  Keep a log of the readings. Maintain a regular exercise program. Reduce the amount of salt in your diet.          For  Diffuse arthralgia:     I also recommend ^.              CHARGE CAPTURE           **Please note: ICD descriptions below are intended for billing purposes only and may not represent clinical diagnoses**        Primary Diagnosis:         401.1 Hypertension            I10    Essential (primary) hypertension              Orders:          84450   Office/outpatient visit; established patient, level 3  (In-House)           V05.3 Vaccination against hepatitis A            Z23    Encounter for immunization              Orders:          51887   HepA vaccine  adult dose for intramuscular use  (In-House)             72429   Immunization administration; each additional vaccine/toxoid  (In-House)           V06.1 Tdap vaccination            Z23    Encounter for immunization              Orders:          41343   Tdap vaccine, when administered to individuals 7 years or older, for intramuscular use  (In-House)             22297   Immunization administration; one vaccine  (In-House)           719.49 Diffuse arthralgia            M25.552    Pain in left hip           M25.551    Pain in right hip           M25.541    Pain in joints of right hand           M25.542    Pain in joints of left hand

## 2021-05-18 NOTE — PROGRESS NOTES
Enedelia Lombardi 1975     Office/Outpatient Visit    Visit Date: Fri, Oct 11, 2019 09:32 am    Provider: Lindsey Beltran N.P. (Assistant: Esperanza Villeda MA)    Location: [facility]        Electronically signed by Lindsey Beltran N.P. on  10/15/2019 11:40:29 AM                             SUBJECTIVE:        CC:     Ms. Lombardi is a 43 year old White female.  This is a follow-up visit.  med refill, soa; PT STATES SHE ISNT TAKING TOPIRAMATE OR ESCITALOPRAM         HPI:         Ms. Lombardi presents with hypertension.  Her current cardiac medication regimen includes bisoprolol/ hctz.  She did not bring her blood pressure diary, but says that pressures have been well controlled.  She is tolerating the medication well without side effects.  Compliance with treatment has been good; she takes her medication as directed.      has seen rheumatology dr carney twice.  does not have lupus.  uses meloxicam prn.         generalized and bruise right knee present x 3 months         With regard to the gERD, the location of the discomfort is primarily midline down the esophagus.  She describes the pain as burning.  It is mild in intensity.  this has been a chronic, recurrent problem for the past 2 years.  The frequency of painful episodes is several times a month.  one course of omeprazole in the past         Additionally, she presents with history of shortness of Breath.  this has been noted for the past 7 to 9 months.  Its course has been waxing and waning in severity.  Associated symptoms include exercise limitation.  This tends to be worse with exertion.  She denies a history of allergy symptoms, asthma, recent URI and former smoker.      ROS:     CONSTITUTIONAL:  Negative for chills, fatigue, fever, and weight change.      E/N/T:  Negative for hearing problems, E/N/T pain, congestion, rhinorrhea, epistaxis, hoarseness, and dental problems.      CARDIOVASCULAR:  Negative for chest pain, palpitations, tachycardia,  orthopnea, and edema.      RESPIRATORY:  Positive for recent cough ( typically dry ) and dyspnea ( with heavy exertion ).   Negative for hemoptysis, pleuritic chest pain or frequent wheezing.      GASTROINTESTINAL:  Positive for acid reflux symptoms.   Negative for constipation, diarrhea, nausea or vomiting.      MUSCULOSKELETAL:  Positive for arthralgias (right knee consistent and diffuse occasional  family hx RA).      NEUROLOGICAL:  Negative for dizziness, headaches, paresthesias, and weakness.      ALLERGIC/IMMUNOLOGIC:  Negative for seasonal allergies.      PSYCHIATRIC:  Negative for anxiety, depression, and sleep disturbances.          PMH/FMH/SH:     Last Reviewed on 2019 02:45 PM by Lindsey Beltran    Past Medical History: dr china grande is PCP                 PAST MEDICAL HISTORY         Hypertension: dx'd at age age 18s;         CURRENT MEDICAL PROVIDERS:    Rheumatologist         Surgical History:     Other Surgeries     section: X 1;     Bilateral Tubal Ligation    kidney reconstruction age 7;         Family History:         Positive for Colon Cancer ( father -- age 65 ) and uterine cancer mother.      Positive for Type 2 Diabetes ( pat. aunt ).          Social History:     Occupation: [employer]     Marital Status:      Children: 1 child         Tobacco/Alcohol/Supplements:     Last Reviewed on 2019 02:11 PM by Spurling, Sarah C    Tobacco: She has a past history of cigarette smoking; quit date:  .              Current Problems:     Diffuse arthralgia     Hypertension         Immunizations:     Havrix -adult dose (HepA) 2019     Hep A, adult dose 2018     Fluzone Quadrivalent (3+ years) 2018     Boostrix (Tdap) 2019         Allergies:     Last Reviewed on 2019 02:11 PM by Spurling, Sarah C      No Known Drug Allergies.         Current Medications:     Last Reviewed on 2019 02:18 PM by Spurling, Sarah C    Bisoprolol/Hydrochlorthiazide 5mg/6.25mg  Tablet Take 1 tablet(s) by mouth daily     Topiramate 50mg Capsules, Extended Release Take 1 cap by mouth daily     Escitalopram Oxalate 10mg Tablet 1 tab daily     B 12 Vitamin 500mcg     Trazodone HCl 100mg Tablet 1-2 tablets nightly as needed     Vitamin D3 5,000IU Capsules 1 capsule every day         OBJECTIVE:        Vitals:         Historical:     01/16/2019  BP:   143/73 mm Hg ( (right arm, , sitting, );)     01/16/2019  Wt:   246.8lbs        Current: 10/11/2019 9:40:44 AM    Ht:  5 ft, 7 in;  Wt: 253.2 lbs;  BMI: 39.7    T: 98.3 F (oral);  BP: 121/64 mm Hg (right arm, sitting);  P: 69 bpm (right arm (BP Cuff), sitting)    O2 Sat: 99 % (room air)        Exams:     PHYSICAL EXAM:     GENERAL: obese;  no apparent distress;     E/N/T: EARS: both TMs are have fluid behind them;  NOSE: nasal mucosa is erythematous;  OROPHARYNX: posterior pharynx, including tonsils, tongue, and uvula are normal;     NECK: thyroid is non-palpable;     RESPIRATORY: normal respiratory rate and pattern with no distress; normal breath sounds with no rales, rhonchi, wheezes or rubs;     CARDIOVASCULAR: normal rate; rhythm is regular;     Peripheral Pulses: posterior tibial: 2+ amplitude, no bruits; no edema;     LYMPHATIC: no enlargement of cervical or facial nodes;     BREAST/INTEGUMENT: (dime size bruise right knee)     MUSCULOSKELETAL:  Normal range of motion, strength and tone;     NEUROLOGIC: mental status: alert and oriented x 3; GROSSLY INTACT     PSYCHIATRIC:  appropriate affect and demeanor; normal speech pattern; grossly normal memory;         ASSESSMENT           786.05   R06.02  Shortness of breath              DDx:     401.1   I10  Hypertension              DDx:     719.49   M25.551  Diffuse arthralgia              DDx:     782.7   R23.3  Increased bruising              DDx:     530.81   K21.9  GERD              DDx:         ORDERS:         Meds Prescribed:       Refill of: Bisoprolol/Hydrochlorthiazide 5mg/6.25mg Tablet  Take 1 tablet(s) by mouth daily  #90 (Ninety) tablet(s) Refills: 1       Omeprazole 40mg Capsules, Extended Release 1 capsule daily  #30 (Thirty) capsule(s) Refills: 0         Radiology/Test Orders:       [id number]  Radiologic exam chest 2 views  (Send-Out)           Lab Orders:       [id number]  [facility] CBC with 3 part diff  (Send-Out)         05222  HPUBT - [facility] H.pylori Breath test  (Send-Out)         65349  [facility] Comp. Metabolic Panel  (Send-Out)           Other Orders:       [id number]  Noninvasive ear or pulse oximetry for oxygen saturation; single determination  (In-House)                   PLAN:          Shortness of breath         RECOMMENDATIONS given include: rest, avoidance of heavy exertion, and consider allergies and reflux as possible contributor.  consider PFT..      cxr due to former smoker MIPS Vaccines Flu and Pneumonia updated in Shot record           Orders:       [id number]  Noninvasive ear or pulse oximetry for oxygen saturation; single determination  (In-House)            Hypertension     LABORATORY:  Labs ordered to be performed today include Comprehensive metabolic panel.      RECOMMENDATIONS given include: avoid pseudoephedrine or other stimulants/decongestants in common cold remedies, perform routine monitoring of blood pressure with home blood pressure cuff, reduction of dietary salt intake, take medication as prescribed, try not to miss doses, and Advised about free BP checks on the last Saturday of each month.            Prescriptions:       Refill of: Bisoprolol/Hydrochlorthiazide 5mg/6.25mg Tablet Take 1 tablet(s) by mouth daily  #90 (Ninety) tablet(s) Refills: 1           Orders:       [id number]  [facility] Comp. Metabolic Panel  (Send-Out)            Diffuse arthralgia         continue with rheumatology .  consider ortho referral.           Increased bruising     LABORATORY:  Labs ordered to be performed today include CBC.            Orders:       [id number]  [facility] CBC with 3 part diff   (Send-Out)            GERD     LABORATORY:  Labs ordered to be performed today include H.pylori urea breath test ([facility]).      RECOMMENDATIONS given include: discontinuation of NSAIDs and aspirin, discontinuation (or at least limitation) of alcohol, avoidance of spicy foods, and reduce caffiene.            Prescriptions:       Omeprazole 40mg Capsules, Extended Release 1 capsule daily  #30 (Thirty) capsule(s) Refills: 0           Orders:       [id number]  HPUBT - [facility] H.pylori Breath test  (Send-Out)             Patient Education Handouts:       Heartburn (Gerd)              Other Orders:       [id number]  Radiologic exam chest 2 views  (Send-Out)           Patient Recommendations:        For  Shortness of breath:     Get plenty of rest. Avoid heavy exertion.          For  Hypertension:     Certain decongestants and stimulants (like pseudoephedrine) in common cold remedies raise blood pressure, sometimes to dangerously high levels. Never take with MAO inhibitors! Begin monitoring your blood pressure by brief nurse visits at our office, a home blood pressure monitor, or by checking on the machines in pharmacies or stores.  Keep a log of the readings. Reduce the amount of salt in your diet.          For  GERD:     Don't take aspirin or anti-inflammatory medications such as ibuprofen and naproxen, as these will further irritate your stomach.  (Tylenol is okay.) Avoid alcohol. Avoid spicy foods if they tend to make your symptoms worse.              CHARGE CAPTURE           **Please note: ICD descriptions below are intended for billing purposes only and may not represent clinical diagnoses**        Primary Diagnosis:         786.05 Shortness of breath            R06.02    Shortness of breath              Orders:          70901   Office/outpatient visit; established patient, level 4  (In-House)             41983   Noninvasive ear or pulse oximetry for oxygen saturation; single determination  (In-House)           401.1 Hypertension             I10    Essential (primary) hypertension    719.49 Diffuse arthralgia            M25.551    Pain in right hip    782.7 Increased bruising            R23.3    Spontaneous ecchymoses    530.81 GERD            K21.9    Gastro-esophageal reflux disease without esophagitis

## 2021-05-18 NOTE — PROGRESS NOTES
Enedelia Lombardi  1975     Office/Outpatient Visit    Visit Date: Fri, Feb 12, 2021 08:56 am    Provider: Lindsey Beltran N.P. (Assistant: Esperanza Villeda MA)    Location: [facility]        Electronically signed by Lindsey Beltran N.P. on  02/14/2021 07:10:57 PM                             Subjective:        CC: Mrs. Lombardi is a 45 year old White female.  This is a follow-up visit.  FMLA?; pt states she is not taking furosemide         HPI:           Patient to be evaluated for essential (primary) hypertension.  Her current cardiac medication regimen includes bisoprolol, hctz.  Compliance with treatment has been good; she takes her medication as directed.  She is tolerating the medication well without side effects.  She did not bring her blood pressure diary, but says that pressures have been well controlled.            In regard to the dyspnea, unspecified, this has been noted for the past year.  Its course has been waxing and waning in severity.  Associated symptoms include chest tightness.  She denies cough, peripheral edema, fever, hemoptysis, hoarseness, orthopnea, sense of impending doom, sputum production or wheezing.  This tends to be worse with exertion.  Medical history is pertinent for allergy symptoms and sleep apnea.  seeing pulmonology but symptoms persist at times.  has had to leave work due to symptoms intermittently.  requests intermittent FMLA.            Dx with palpitations; this first began approximately 3 months ago.  She describes the sensation as rapid heart beat.  This is barely noticeable.  The frequency of the episodes is approximately 1 times per week.  The average duration of each episode is less than one minute.  There are no identifiable aggravating factors.  Nothing seems to alleviate the dizziness.      ROS:     CONSTITUTIONAL:  Negative for chills, fatigue, fever, and weight change.      CARDIOVASCULAR:  Positive for chest pain ( unrelated to exertion ) and  palpitations.   Negative for dizziness or pedal edema.      RESPIRATORY:  Negative for cough, dyspnea, and hemoptysis.      GASTROINTESTINAL:  Negative for abdominal pain, heartburn, constipation, diarrhea, and stool changes.      MUSCULOSKELETAL:  Positive for arthralgias and limb pain ( bilateral leg pain ).      NEUROLOGICAL:  Negative for dizziness, headaches, paresthesias, and weakness.      PSYCHIATRIC:  Negative for anxiety, depression, and sleep disturbances.          Past Medical History / Family History / Social History:         Last Reviewed on 2021 09:06 AM by Lindsey Beltran    Past Medical History: dr china grande is PCP                 PAST MEDICAL HISTORY         Hypertension: dx'd at age age 18s;     Sleep Apnea: dx'd in aug 2020; (+) sleep study;         CURRENT MEDICAL PROVIDERS:    Orthopedist    Rheumatologist         PREVENTIVE HEALTH MAINTENANCE             EYE EXAM: was last done      MAMMOGRAM: was last done  with normal results     PAP SMEAR: was last done  with normal results         Surgical History:     Other Surgeries     section: X 1;     Bilateral Tubal Ligation    kidney reconstruction age 7;         Family History:         Positive for Colon Cancer ( father -- age 65 ) and uterine cancer mother.      Positive for Type 2 Diabetes ( pat. aunt ).          Social History:     Occupation: [employer]     Marital Status:      Children: 1 child         Tobacco/Alcohol/Supplements:     Last Reviewed on 2021 09:01 AM by Esperanza Villeda    Tobacco: She has a past history of cigarette smoking; quit date:  .          Substance Abuse History:     Last Reviewed on 2020 03:03 PM by Yoni Solorzano        Mental Health History:     Last Reviewed on 2020 03:03 PM by Yoni Solorzano        Communicable Diseases (eg STDs):     Last Reviewed on 2020 03:03 PM by Yoni Solorzano        Current Problems:     Last Reviewed on 2021  09:06 AM by Lindsey Beltran    Pain in right hip    Essential (primary) hypertension    Gastro-esophageal reflux disease without esophagitis    Family history of malignant neoplasm of digestive organs    Pain in left hip    Pain in left leg    Dyspnea, unspecified    Irregular menstruation, unspecified    Palpitations        Immunizations:     Havrix -adult dose (HepA) 1/16/2019    Hep A, adult dose 4/24/2018    Fluzone Quadrivalent (3+ years) 11/8/2018    Fluzone Quadrivalent (3+ years) 10/1/2019    Boostrix (Tdap) 1/16/2019        Allergies:     Last Reviewed on 2/12/2021 09:01 AM by Esperanza Villeda    No Known Allergies.        Current Medications:     Last Reviewed on 2/12/2021 09:01 AM by Esperanza Villeda    bisoproloL-hydrochlorothiazide 10-6.25 mg oral tablet [TAKE 1 TABLET BY MOUTH EVERY DAY]    MONTELUKAST SOD 10 MG TABLET    SYMBICORT 160-4.5 MCG INHALER    THEOPHYLLINE  MG TABLET        Objective:        Vitals:         Historical:     7/10/2020  BP:   134/73 mm Hg ( (left arm, , sitting, );) 7/10/2020  Wt:   204gdj85/28/2020  Wt:   261.8lbs    Current: 2/12/2021 9:02:55 AM    Ht:  5 ft, 7 in;  Wt: 265.2 lbs;  BMI: 41.5T: 95.4 F (temporal);  BP: 130/70 mm Hg (left arm, sitting);  P: 68 bpm (left arm (BP Cuff), sitting);  sCr: 0.88 mg/dL;  GFR: 102.96        Exams:     PHYSICAL EXAM:     GENERAL: obese;  no apparent distress;     RESPIRATORY: normal respiratory rate and pattern with no distress; normal breath sounds with no rales, rhonchi, wheezes or rubs;     CARDIOVASCULAR: normal rate; rhythm is regular;     Peripheral Pulses: posterior tibial: 2+ amplitude, no bruits; no edema;     MUSCULOSKELETAL:  Normal range of motion, strength and tone;     NEUROLOGIC: mental status: alert and oriented x 3; GROSSLY INTACT     PSYCHIATRIC:  appropriate affect and demeanor; normal speech pattern; grossly normal memory;         Lab/Test Results:         LABORATORY RESULTS: EKG performed by tls         Assessment:          I10   Essential (primary) hypertension       R06.00   Dyspnea, unspecified       R00.2   Palpitations       R53.83   Other fatigue       M79.606   Pain in leg, unspecified           ORDERS:         Meds Prescribed:       [Refilled] bisoproloL-hydrochlorothiazide 10-6.25 mg oral tablet [TAKE 1 TABLET BY MOUTH EVERY DAY], #90 (ninety) tablets, Refills: 1 (one)         Radiology/Test Orders:       [id number]  Electrocardiogram, routine with at least 12 leads; with interpretation and report  (In-House)              Lab Orders:       [id number]  HTNLP - [facility] CMP AND LIPID: [id number], [id number]  (Send-Out)            61354  VITD - [facility] Vitamin D, 25 Hydroxy  (Send-Out)            29589  VB12 - [facility] Vitamin B12  (Send-Out)            18839  TSH - [facility] TSH  (Send-Out)              Procedures Ordered:       REFER  Referral to Specialist or Other Facility  (Send-Out)                      Plan:         Essential (primary) hypertension    LABORATORY:  Labs ordered to be performed today include HTN/Lipid Panel: CMP, Lipid.  MIPS Vaccines Flu and Pneumonia updated in Shot record     RECOMMENDATIONS given include: avoid pseudoephedrine or other stimulants/decongestants in common cold remedies, perform routine monitoring of blood pressure with home blood pressure cuff, reduction of dietary salt intake, take medication as prescribed, try not to miss doses, weight loss, and stress reduction.            Prescriptions:       [Refilled] bisoproloL-hydrochlorothiazide 10-6.25 mg oral tablet [TAKE 1 TABLET BY MOUTH EVERY DAY], #90 (ninety) tablets, Refills: 1 (one)           Orders:       [id number]  HTNLP - [facility] CMP AND LIPID: [id number], [id number]  (Send-Out)              Dyspnea, unspecifiedintermittent fmla 1-4 times per month x 1 year        RECOMMENDATIONS given include: rest, avoidance of heavy exertion, identification and avoidance of asthma triggers, avoidance of cigarette smoke, and interventions per pulmonology mildly effective.   proceed with cardiac  evaluation..  echocardiogram was scheduled last summer.  pt not sure why that was not done.  to Er if worsens..          Palpitations    LABORATORY:  Labs ordered to be performed today include TSH.      REFERRALS:  Referral initiated to a cardiologist.      RECOMMENDATIONS given include: rest whenever possible while symptoms persist, use caution when driving, and advise cardiology referral for echocardiogram and possible stress test..            Orders:       [id number]  Electrocardiogram, routine with at least 12 leads; with interpretation and report  (In-House)            01356  TSH - [facility] TSH  (Send-Out)            REFER  Referral to Specialist or Other Facility  (Send-Out)              Other fatigue    LABORATORY:  Labs ordered to be performed today include B12.            Orders:       14876  VB12 - [facility] Vitamin B12  (Send-Out)              Pain in leg, unspecified    LABORATORY:  Labs ordered to be performed today include Vitamin D.            Orders:       31880  VITD - [facility] Vitamin D, 25 Hydroxy  (Send-Out)                  Patient Recommendations:        For  Essential (primary) hypertension:    Certain decongestants and stimulants (like pseudoephedrine) in common cold remedies raise blood pressure, sometimes to dangerously high levels. Never take with MAO inhibitors! Begin monitoring your blood pressure by brief nurse visits at our office, a home blood pressure monitor, or by checking on the machines in pharmacies or stores.  Keep a log of the readings. Reduce the amount of salt in your diet. Try to lose some weight; even modest weight reduction can improve your blood pressure. Try and reduce the effects of stress on blood pressure by relaxation, meditation, biofeedback, exercise or other stress reduction methods.          For  Dyspnea, unspecified:    Get plenty of rest. Avoid heavy exertion. Avoid anything that you have been able to identify as a trigger for your asthma (for example, cigarette smoke, cat or dog  hair, chemical fumes, etc.). Avoid cigarette smoke.          For  Palpitations:    Get plenty of rest. Use caution when driving.  If you have experienced loss of consciousness, you should not drive, but allow someone else to drive for you.              Charge Capture:         Primary Diagnosis:     I10  Essential (primary) hypertension           Orders:      32328  Office/outpatient visit; established patient, level 4  (In-House)              R06.00  Dyspnea, unspecified     R00.2  Palpitations           Orders:      62121  Electrocardiogram, routine with at least 12 leads; with interpretation and report  (In-House)              R53.83  Other fatigue     M79.606  Pain in leg, unspecified

## 2021-05-18 NOTE — PROGRESS NOTES
Enedelia Lombardi  1975     Office/Outpatient Visit    Visit Date: Fri, Jul 10, 2020 03:05 pm    Provider: Lindsey Beltran N.P. (Assistant: Kae Eubanks LPN)    Location: [facility]        Electronically signed by Lindsey Beltran N.P. on  07/14/2020 12:42:20 PM                             Subjective:        CC: Mrs. Lombardi is a 44 year old White female.  This is a follow-up visit.          HPI:           Patient presents with essential (primary) hypertension.  Her current cardiac medication regimen includes bisoprolol, hctz.  Compliance with treatment has been good; she takes her medication as directed.  She has been experiencing possible adverse medication effects, including edema.  Review of her blood pressure log reveals systolics in the 130s to 140s and diastolics in the 80s.            With regard to the irregular menstruation, unspecified, current menstrual pattern is described as frequent menses ( occurring every 2-3 weeks ), averaging 5 days in duration, variable in duration, with heavy flow.  normal pap 2018.  looking for new , female GYN preferred.  normal tsh may 2020.            With regard to the dyspnea, unspecified, this has been noted for the past year.  Its course has been waxing and waning in severity.  Recent tests include a D-Dimer of D-Dimer ng/ml, , and normal CXR.  2 negative cxr since october.  echo done june 24.  no result reported to her yet.  denies chest pain.      ROS:     CONSTITUTIONAL:  Negative for chills, fatigue, fever, and weight change.      CARDIOVASCULAR:  Positive for pedal edema ( mild ).   Negative for chest pain or orthopnea.      RESPIRATORY:  Positive for dyspnea ( with moderate exertion ).   Negative for recent cough, chronic cough, pleuritic chest pain or frequent wheezing.      GASTROINTESTINAL:  Negative for abdominal pain, heartburn, constipation, diarrhea, and stool changes.      GENITOURINARY:  Positive for irregular menstrual cycle.    Negative for vaginal discharge.      MUSCULOSKELETAL:  Negative for back pain.      NEUROLOGICAL:  Negative for dizziness, headaches, paresthesias, and weakness.      PSYCHIATRIC:  Negative for anxiety, depression, and sleep disturbances.          Past Medical History / Family History / Social History:         Last Reviewed on 2020 03:03 PM by Yoni Solorzano    Past Medical History: dr china grande is PCP                 PAST MEDICAL HISTORY         Hypertension: dx'd at age age 18s;         CURRENT MEDICAL PROVIDERS:    Orthopedist    Rheumatologist         PREVENTIVE HEALTH MAINTENANCE             EYE EXAM: was last done 2019     MAMMOGRAM: was last done 2019 with normal results     PAP SMEAR: was last done 2018 with normal results         Surgical History:     Other Surgeries     section: X 1;     Bilateral Tubal Ligation    kidney reconstruction age 7;         Family History:         Positive for Colon Cancer ( father -- age 65 ) and uterine cancer mother.      Positive for Type 2 Diabetes ( pat. aunt ).          Social History:     Occupation: [employer]     Marital Status:      Children: 1 child         Tobacco/Alcohol/Supplements:     Last Reviewed on 2020 03:03 PM by Yoni Solorzano    Tobacco: She has a past history of cigarette smoking; quit date:  .          Substance Abuse History:     Last Reviewed on 2020 03:03 PM by Yoni Solorzano        Mental Health History:     Last Reviewed on 2020 03:03 PM by Yoni Solorzano        Communicable Diseases (eg STDs):     Last Reviewed on 2020 03:03 PM by Yoni Solorzano        Current Problems:     Last Reviewed on 2020 03:03 PM by Yoni Solorzano    Pain in right hip    Essential (primary) hypertension    Gastro-esophageal reflux disease without esophagitis    Encounter for general adult medical examination without abnormal findings    Encounter for screening for depression     Family history of malignant neoplasm of digestive organs    Pain in left hip    Pain in left leg    Dyspnea, unspecified        Immunizations:     Havrix -adult dose (HepA) 1/16/2019    Hep A, adult dose 4/24/2018    Fluzone Quadrivalent (3+ years) 11/8/2018    Fluzone Quadrivalent (3+ years) 10/1/2019    Boostrix (Tdap) 1/16/2019        Allergies:     Last Reviewed on 5/28/2020 03:03 PM by Yoni Solorzano    No Known Allergies.        Current Medications:     Last Reviewed on 7/10/2020 03:14 PM by Kae Eubanks    bisoproloL-hydrochlorothiazide 5-6.25 mg oral tablet [TAKE 1 TABLET DAILY]    furosemide 20 mg oral tablet [take 1 tablet (20 mg) by oral route once daily as needed]        Objective:        Vitals:         Historical:     5/28/2020  BP:   133/75 mm Hg ( (left arm, , sitting, );) 5/28/2020  Wt:   261.8lbs    Current: 7/10/2020 3:14:03 PM    Ht:  5 ft, 7 in;  Wt: 256 lbs;  BMI: 40.1T: 97.1 F (oral);  BP: 134/73 mm Hg (left arm, sitting);  P: 79 bpm (left arm (BP Cuff), sitting);  sCr: 0.88 mg/dL;  GFR: 102.49        Exams:     PHYSICAL EXAM:     GENERAL: obese;  no apparent distress;     RESPIRATORY: normal respiratory rate and pattern with no distress; normal breath sounds with no rales, rhonchi, wheezes or rubs;     CARDIOVASCULAR: normal rate; rhythm is regular;     Peripheral Pulses: posterior tibial: 2+ amplitude, no bruits; no edema;     MUSCULOSKELETAL:  Normal range of motion, strength and tone;     NEUROLOGIC: mental status: alert and oriented x 3; GROSSLY INTACT     PSYCHIATRIC:  appropriate affect and demeanor; normal speech pattern; grossly normal memory;         Assessment:         I10   Essential (primary) hypertension       N92.6   Irregular menstruation, unspecified       R06.00   Dyspnea, unspecified           ORDERS:         Meds Prescribed:       [New Rx] bisoproloL-hydrochlorothiazide 10-6.25 mg oral tablet [take 1 tablet by oral route once daily], #90 (ninety) tablets,  Refills: 1 (one)         Lab Orders:       65545  BDCBC - [facility] CBC with 3 part diff  (Send-Out)            05688  PTPTT - [facility] PT AND PTT  (Send-Out)            [id number]  LPDP - [facility] Lipid Panel  (Send-Out)                      Plan:         Essential (primary) hypertension    LABORATORY:  Labs ordered to be performed today include lipid panel.      RECOMMENDATIONS given include: avoid pseudoephedrine or other stimulants/decongestants in common cold remedies, perform routine monitoring of blood pressure with home blood pressure cuff, reduction of dietary salt intake, take medication as prescribed, try not to miss doses, and increase bisoprolol dose.  rarely has to use furosemide..      review may 2020 CMP results.            Prescriptions:       [New Rx] bisoproloL-hydrochlorothiazide 10-6.25 mg oral tablet [take 1 tablet by oral route once daily], #90 (ninety) tablets, Refills: 1 (one)           Orders:       [id number]  LPDP - [facility] Lipid Panel  (Send-Out)                Patient Education Handouts:       High Blood Pressure (HTN)          Irregular menstruation, unspecified    LABORATORY:  Labs ordered to be performed today include CBC and PT and PTT.      RECOMMENDATIONS given include: number provided to dr mallorie roy for GYN care and evaluation.  rule out bleeding issue with labs as below.  LMP 2 wks ago..            Orders:       [id number]  BDCB - [facility] CBC with 3 part diff  (Send-Out)            07188  PTPTT - [facility] PT AND PTT  (Send-Out)              Dyspnea, unspecified        RECOMMENDATIONS given include: avoidance of heavy exertion, avoidance of cigarette smoke, and echocardiogram result pending.  denies GERD or anxiety.  continue cardiac and pulmonary evaluation..      REFERRALS:  Referral initiated to a pulmonologist.              Patient Recommendations:        For  Essential (primary) hypertension:    Certain decongestants and stimulants (like pseudoephedrine) in common cold remedies raise blood pressure, sometimes to  dangerously high levels. Never take with MAO inhibitors! Begin monitoring your blood pressure by brief nurse visits at our office, a home blood pressure monitor, or by checking on the machines in pharmacies or stores.  Keep a log of the readings. Reduce the amount of salt in your diet.          For  Dyspnea, unspecified:    Avoid heavy exertion. Avoid cigarette smoke.              Charge Capture:         Primary Diagnosis:     I10  Essential (primary) hypertension           Orders:      22216  Office/outpatient visit; established patient, level 4  (In-House)              N92.6  Irregular menstruation, unspecified     R06.00  Dyspnea, unspecified

## 2021-05-18 NOTE — PROGRESS NOTES
Enedelia Lombardi 1975     Office/Outpatient Visit    Visit Date: Tue, Jan 14, 2020 1:00 pm    Provider: Lindsey Beltran N.P. (Assistant: Mary Kate Tomas MA )    Location: [facility]        Electronically signed by Lindsey Beltran N.P. on  01/15/2020 06:48:30 PM                             Subjective:        CC: Ms. Lombardi is a 44 year old White female.  preventative exam;         HPI:           PHQ-9 Depression Screening: Completed form scanned and in chart; Total Score 4           Dx with encounter for general adult medical examination without abnormal findings; her last physical exam was 1 year ago.  Her last menstrual period was dec 20.  Her current method of contraception is a tubal ligation.  She performs breast self-exams monthly.   Her last Pap smear was 2 years ago and was normal.   Her last mammogram was 1 year ago and was normal.   She has never had a flexible sigmoidoscopy or colonoscopy. She's had vision screening done 1 year ago and this was abnormal, but is corrected with glasses.   Preventative Health updated today.  She is current with her Td and influenza immunization.  Ms. Lombardi has had an abnormal Pap smear in the past. age 16 Tobacco: Former smoker     ROS:     CONSTITUTIONAL:  Negative for chills, fatigue, fever, and weight change.      E/N/T:  Negative for hearing problems, E/N/T pain, congestion, rhinorrhea, epistaxis, hoarseness, and dental problems.      CARDIOVASCULAR:  Negative for chest pain, palpitations, tachycardia, orthopnea, and edema.      RESPIRATORY:  Positive for dyspnea ( with heavy exertion; cxr last visit normal ).   Negative for chronic cough, pleuritic chest pain or frequent wheezing.      GASTROINTESTINAL:  Positive for acid reflux symptoms ( improved on omeprazole but increasing again.  does take nsaids regularly for diffuse joint pain ).   Negative for abdominal pain, constipation, diarrhea, hematochezia, nausea or vomiting.      MUSCULOSKELETAL:   Positive for arthralgias, (mostly bilateral hips with some intermittent, diffuse.  negative rheumatology work up) limb pain ( bilateral leg pain ) and myalgias.   Negative for back pain.      INTEGUMENTARY/BREAST:  Negative for rash.      NEUROLOGICAL:  Negative for dizziness, headaches, paresthesias, and weakness.      PSYCHIATRIC:  Negative for anxiety, depression, and sleep disturbances.          Past Medical History / Family History / Social History:         Last Reviewed on 2020 02:02 PM by Lindsey Beltran    Past Medical History: dr china grande is PCP                 PAST MEDICAL HISTORY         Hypertension: dx'd at age age 18s;         CURRENT MEDICAL PROVIDERS:    Rheumatologist         PREVENTIVE HEALTH MAINTENANCE             EYE EXAM: was last done      MAMMOGRAM: was last done  with normal results     PAP SMEAR: was last done  with normal results         Surgical History:     Other Surgeries     section: X 1;     Bilateral Tubal Ligation    kidney reconstruction age 7;         Family History:         Positive for Colon Cancer ( father -- age 65 ) and uterine cancer mother.      Positive for Type 2 Diabetes ( pat. aunt ).          Social History:     Occupation: [employer]     Marital Status:      Children: 1 child         Tobacco/Alcohol/Supplements:     Last Reviewed on 2020 01:07 PM by Mary Kate Tomas    Tobacco: She has a past history of cigarette smoking; quit date:  .          Current Problems:     Last Reviewed on 2020 01:21 PM by Lindsey Beltran    Essential (primary) hypertension    Pain in right hip    Gastro-esophageal reflux disease without esophagitis    Encounter for general adult medical examination without abnormal findings    Encounter for screening for depression        Immunizations:     Havrix -adult dose (HepA) 2019    Hep A, adult dose 2018    Fluzone Quadrivalent (3+ years) 2018    Fluzone Quadrivalent (3+ years)  10/1/2019    Boostrix (Tdap) 1/16/2019        Allergies:     Last Reviewed on 10/11/2019 09:33 AM by Esperanza Villeda    No Known Allergies.        Current Medications:     Last Reviewed on 1/14/2020 01:07 PM by Mary Kate Tomas    Escitalopram Oxalate 10mg Tablet [1 tab daily]    Bisoprolol/Hydrochlorthiazide 5mg/6.25mg Tablet [Take 1 tablet(s) by mouth daily]    Topiramate 50mg Capsules, Extended Release [Take 1 cap by mouth daily]    Trazodone HCl 100mg Tablet [1-2 tablets nightly as needed]    Meloxicam 7.5mg Tablet [1 TAB DAILY WITH FOOD]        Objective:        Vitals:         Historical:     10/11/2019  BP:   121/64 mm Hg ( (right arm, , sitting, );) 10/11/2019  Wt:   253.2lbs    Current: 1/14/2020 1:09:33 PM    Ht:  5 ft, 7 in;  Wt: 257.2 lbs;  BMI: 40.3T: 98.6 F (oral);  BP: 126/57 mm Hg (right arm, sitting);  P: 72 bpm (right arm (BP Cuff), sitting);  sCr: 0.85 mg/dL;  GFR: 106.32        Exams:     PHYSICAL EXAM:     GENERAL: obese;  no apparent distress;     EYES: PERRL, EOMI     E/N/T: EARS: bilateral TMs are normal;  NOSE: normal nasal mucosa; OROPHARYNX: posterior pharynx, including tonsils, tongue, and uvula are normal;     NECK: thyroid is non-palpable;     RESPIRATORY: normal respiratory rate and pattern with no distress; normal breath sounds with no rales, rhonchi, wheezes or rubs;     CARDIOVASCULAR: normal rate; rhythm is regular;  no edema;     GASTROINTESTINAL: nontender; normal bowel sounds; no organomegaly;     MUSCULOSKELETAL: normal gait; normal range of motion of all major muscle groups; pain with range of motion in: bilateral hip flexion and extension;  muscle strength: 5/5 in all major muscle groups;  normal overall tone negative for muscle tenderness     NEUROLOGIC: mental status: alert and oriented x 3; GROSSLY INTACT     PSYCHIATRIC:  appropriate affect and demeanor; normal speech pattern; grossly normal memory;         Assessment:         Z13.31   Encounter for screening for  depression       Z00.00   Encounter for general adult medical examination without abnormal findings       M25.552   Pain in left hip       M25.551   Pain in right hip       K21.9   Gastro-esophageal reflux disease without esophagitis       Z80.0   Family history of malignant neoplasm of digestive organs           ORDERS:         Procedures Ordered:       REFER  Referral to Specialist or Other Facility  (Send-Out)              Other Orders:         Depression screen negative  (In-House)                      Plan:         Encounter for screening for depression    MIPS PHQ-9 Depression Screening: Completed form scanned and in chart; Total Score 4; Negative Depression Screen           Orders:         Depression screen negative  (In-House)              Encounter for general adult medical examination without abnormal findings        COUNSELING was provided today regarding the following topics: healthy eating habits, low cholesterol diet, low salt diet, regular exercise, breast self-exam, and STD prevention.            Patient Education Handouts:       Physical Exam 40-49 year, Female          Pain in left hip        REFERRALS:  Referral initiated to an orthopedist.      RECOMMENDATIONS given include: rheumatology work up negative.  hips are worst symptom and worse at night.  interferes with sleep.  denies back pain.  exam negative for fibromyalgia.  get opinion of ortho who will order xrays in his office.  warn that nsaids can contribute to stomach upset..            Orders:       REFER  Referral to Specialist or Other Facility  (Send-Out)              Gastro-esophageal reflux disease without esophagitis        RECOMMENDATIONS given include: over the counter pepcid daily x 1 month.  consider EGD..          Family history of malignant neoplasm of digestive organs        RECOMMENDATIONS given include: advise colonoscopy .  declines.  will call back if wants referral for colonoscopy..              Patient  Recommendations:        For  Encounter for general adult medical examination without abnormal findings:        Limit dietary intake of fat (especially saturated fat) and cholesterol.  Eat a variety of foods, including plenty of fruits, vegetables, and grain containg fiber, limit fat intake to 30% of total calories. Balance caloric intake with energy expended.    Maintaining regular physical activity is advised to help prevent heart disease, hypertension, diabetes, and obesity.    You should regularly examine your breasts, easily done while in the shower or with lotion.  Feel and look for differences in consistency from month to month, especially noting knots or lumps, changes in skin appearance, nipple retraction or discharge.    Sexually transmitted diseases may be prevented by abstaining from sexual activity or avoiding sexual contact with high risk partners, and consistently using a condom or female barrier contraceptives plus spermacide.              Charge Capture:         Primary Diagnosis:     Z13.31  Encounter for screening for depression           Orders:      [id number]  Office/outpatient visit; established patient, level 2  (In-House)              Depression screen negative  (In-House)              Z00.00  Encounter for general adult medical examination without abnormal findings           Orders:      51018  Preventive medicine, established patient, age 40-64 years  (In-House)              M25.552  Pain in left hip     M25.551  Pain in right hip     K21.9  Gastro-esophageal reflux disease without esophagitis     Z80.0  Family history of malignant neoplasm of digestive organs

## 2021-05-18 NOTE — PROGRESS NOTES
Enedelia Mcadams 1975     Office/Outpatient Visit    Visit Date: Tue, Jan 8, 2019 01:54 pm    Provider: Lindsey Beltran N.P. (Assistant: Bartolo Tatum)    Location: [facility]        Electronically signed by Lindsey Beltran N.P. on  01/10/2019 08:25:49 AM                             SUBJECTIVE:        CC:     Ms. Mcadams is a 43 year old female.  This is her first visit to the clinic.  work physical;         HPI:         Patient complains of health checkup.  Her last physical exam was 4 months ago.  Her last menstrual period was 1/4/19.  She is not currently using any form of contraception.  She performs breast self-exams monthly.    Her last Pap smear was 1 year ago and was normal.   Her last mammogram was 1 year ago and was normal.   She's had vision screening done 3 years ago.   Preventative Health updated today.  She is current with her influenza immunization.  Ms. Mcadams has had an abnormal Pap smear in the past. years ago Tobacco: Past history of cigarette smoking, but has quit.          PHQ-9 Depression Screening: Completed form scanned and in chart; Total Score 2 Alcohol Consumption Screening: Completed form scanned and in chart; Total Score 2     ROS:     CONSTITUTIONAL:  Positive for fatigue.   Negative for fever.      EYES:  Positive for use of glasses.      E/N/T:  Negative for hearing problems, E/N/T pain, congestion, rhinorrhea, epistaxis, hoarseness, and dental problems.      CARDIOVASCULAR:  Positive for pedal edema ( late day only ).      RESPIRATORY:  Negative for cough, dyspnea, and hemoptysis.      GASTROINTESTINAL:  Negative for abdominal pain, heartburn, constipation, diarrhea, and stool changes.      GENITOURINARY:  Positive for nocturia.      MUSCULOSKELETAL:  Negative for arthralgias, back pain, and myalgias.      NEUROLOGICAL:  Negative for dizziness, headaches, paresthesias, and weakness.      ENDOCRINE:  Negative for hair loss, heat/cold intolerance, polydipsia, and  polyphagia.      ALLERGIC/IMMUNOLOGIC:  Negative for allergies, frequent illnesses, HIV exposure, and urticaria.      PSYCHIATRIC:  Positive for insomnia; dr atkinson rx trazadone but does not like to take during work week.   Negative for anxiety, depression, feelings of stress or suicidal thoughts.          PMH/FMH/SH:     Last Reviewed on 2019 02:45 PM by Lindsey Beltran    Past Medical History: dr china grande is PCP                 PAST MEDICAL HISTORY         Hypertension: dx'd at age age 18s;         Surgical History:     Other Surgeries     section: X 1;     Bilateral Tubal Ligation    kidney reconstruction age 7;         Family History:         Positive for Colon Cancer ( father -- age 65 ) and uterine cancer mother.      Positive for Type 2 Diabetes ( pat. aunt ).          Social History:     Occupation: [employer]     Marital Status:      Children: 1 child         Tobacco/Alcohol/Supplements:     Last Reviewed on 2019 01:59 PM by Bartolo Tatum    Tobacco: She has a past history of cigarette smoking; quit date:  .              Current Problems:       None Recorded         Immunizations:     Fluzone Quadrivalent (3+ years) 2018         Allergies:     Last Reviewed on 2019 01:59 PM by Bartolo Tatum      No Known Drug Allergies.         Current Medications:     Last Reviewed on 2019 02:00 PM by Bartolo Tatum    Bisoprolol/Hydrochlorthiazide 5mg/6.25mg Tablet Take 1 tablet(s) by mouth daily     Escitalopram Oxalate 10mg Tablet 1 tab daily         OBJECTIVE:        Vitals:         Current: 2019 2:03:07 PM    Ht:  5 ft, 7 in;  Wt: 250 lbs;  BMI: 39.2    T: 98.3 F;  BP: 138/63 mm Hg (right arm, sitting);  P: 72 bpm (right arm (BP Cuff), sitting)        Exams:     PHYSICAL EXAM:     GENERAL: no apparent distress;     EYES: PERRL, EOMI     E/N/T: EARS: bilateral TMs are normal;  left ear canal with flesh colored growth 3-4 mm at 9 oclock;     NECK: thyroid is non-palpable;      RESPIRATORY: normal respiratory rate and pattern with no distress; normal breath sounds with no rales, rhonchi, wheezes or rubs;     CARDIOVASCULAR: normal rate; rhythm is regular;  no edema;     GASTROINTESTINAL: nontender; normal bowel sounds;     MUSCULOSKELETAL:  Normal range of motion, strength and tone;     NEUROLOGIC: mental status: alert and oriented x 3; GROSSLY INTACT     PSYCHIATRIC:  appropriate affect and demeanor; normal speech pattern; grossly normal memory;         Lab/Test Results:             Total Cholesterol:  183 (01/08/2019),     HDL:  37 (01/08/2019),     Triglycerides:  73 (01/08/2019),     LDL:  132 (01/08/2019),     non-HDL:  146 (01/08/2019),     LDL/HDL Ratio:  3.5 (01/08/2019),     Performed by::  ivan (01/08/2019),             ASSESSMENT           V70.0   Z00.00  Health checkup              DDx:     V79.0   Z13.89  Screening for depression              DDx:         ORDERS:         Lab Orders:       83227*  Lipid panel inhouse  (In-House)         55555*  Glucose quantitative inhouse  (In-House)           Procedures Ordered:       [id number]  Collection of capillary blood specimen (eg, finger, heel, ear stick)  (In-House)           Other Orders:         Depression screen negative  (In-House)                   PLAN:          Health checkup         COUNSELING was provided today regarding the following topics: healthy eating habits, low cholesterol diet, low salt diet, regular exercise, breast self-exam, increase exercise to help lower LDL cholesterol., and may return for tdap or hep a vaccine..            Orders:       38774  Collection of capillary blood specimen (eg, finger, heel, ear stick)  (In-House)         73244*  Lipid panel inhouse  (In-House)         06822*  Glucose quantitative inhouse  (In-House)             Patient Education Handouts:       Physical Exam 40-49 year, Female           Screening for depression     MIPS PHQ-9 Depression Screening Completed form scanned and in  chart; Total Score 2           Orders:         Depression screen negative  (In-House)               Patient Recommendations:        For  Health checkup:         Limit dietary intake of fat (especially saturated fat) and cholesterol.  Eat a variety of foods, including plenty of fruits, vegetables, and grain containg fiber, limit fat intake to 30% of total calories. Balance caloric intake with energy expended.    Maintaining regular physical activity is advised to help prevent heart disease, hypertension, diabetes, and obesity.    You should regularly examine your breasts, easily done while in the shower or with lotion.  Feel and look for differences in consistency from month to month, especially noting knots or lumps, changes in skin appearance, nipple retraction or discharge.              CHARGE CAPTURE           **Please note: ICD descriptions below are intended for billing purposes only and may not represent clinical diagnoses**        Primary Diagnosis:         V70.0 Health checkup            Z00.00    Encounter for general adult medical examination without abnormal findings              Orders:          43177   Preventive medicine, new patient, age 40-64 years  (In-House)             59549   Collection of capillary blood specimen (eg, finger, heel, ear stick)  (In-House)             74190*   Lipid panel inhouse  (In-House)             05623*   Glucose quantitative inhouse  (In-House)           V79.0 Screening for depression            Z13.89    Encounter for screening for other disorder              Orders:             Depression screen negative  (In-House)

## 2021-05-18 NOTE — PROGRESS NOTES
HarjitEnedelia BALBINA  1975     Office/Outpatient Visit    Visit Date: Thu, May 28, 2020 02:45 pm    Provider: Yoni Solorzano MD (Assistant: Dolores Hernandez RN)    Location: [facility]        Electronically signed by Yoni Solorzano MD on  05/30/2020 08:52:22 AM                             Subjective:        CC: 'because of my leg'    HPI:       Enedelia is being seen today for follow up on L leg pain.  This has been an issue for the last 2-3 months, but it is worse recently.  She has noted some color change with some redness and has pain when she touches it or puts pressure on it.  It also becomes painful when she is up on her feet.  She works at [employer] and is up on her feet all the time there.  She does not wear any compression stockings.  However, she noted redness and swelling above the sock.          With regard to the essential (primary) hypertension, her current cardiac medication regimen includes a combination medication ( Ziac ).  She is tolerating the medication well without side effects.  Compliance with treatment has been good; she takes her medication as directed and follows up as directed.      ROS:     CONSTITUTIONAL:  Negative for chills and fever.      CARDIOVASCULAR:  Negative for chest pain and palpitations.      RESPIRATORY:  Positive for dyspnea ( with moderate exertion ).   Negative for recent cough.      GASTROINTESTINAL:  Negative for abdominal pain, nausea and vomiting.      INTEGUMENTARY/BREAST:  Negative for atypical mole(s) and rash.          Past Medical History / Family History / Social History:         Last Reviewed on 5/28/2020 03:03 PM by Yoni Solorzano    Past Medical History: dr china grande is PCP                 PAST MEDICAL HISTORY         Hypertension: dx'd at age age 18s;         CURRENT MEDICAL PROVIDERS:    Orthopedist    Rheumatologist         PREVENTIVE HEALTH MAINTENANCE             EYE EXAM: was last done 2019     MAMMOGRAM: was last done  2019 with normal results     PAP SMEAR: was last done 2018 with normal results         Surgical History:     Other Surgeries     section: X 1;     Bilateral Tubal Ligation    kidney reconstruction age 7;         Family History:         Positive for Colon Cancer ( father -- age 65 ) and uterine cancer mother.      Positive for Type 2 Diabetes ( pat. aunt ).          Social History:     Occupation: [employer]     Marital Status:      Children: 1 child         Tobacco/Alcohol/Supplements:     Last Reviewed on 2020 03:03 PM by Yoni Solorzano    Tobacco: She has a past history of cigarette smoking; quit date:  .          Substance Abuse History:     Last Reviewed on 2020 03:03 PM by Yoni Solorzano        Mental Health History:     Last Reviewed on 2020 03:03 PM by Yoni Solorzano        Communicable Diseases (eg STDs):     Last Reviewed on 2020 03:03 PM by Yoni Solorzano        Current Problems:     Last Reviewed on 2020 03:03 PM by Yoni Solorzano    Pain in right hip    Essential (primary) hypertension    Gastro-esophageal reflux disease without esophagitis    Pain in left hip    Encounter for general adult medical examination without abnormal findings    Encounter for screening for depression    Family history of malignant neoplasm of digestive organs        Immunizations:     Havrix -adult dose (HepA) 2019    Hep A, adult dose 2018    Fluzone Quadrivalent (3+ years) 2018    Fluzone Quadrivalent (3+ years) 10/1/2019    Boostrix (Tdap) 2019        Allergies:     Last Reviewed on 2020 03:03 PM by Yoni Solorzano    No Known Allergies.        Current Medications:     Last Reviewed on 2020 03:03 PM by Yoni Solorzano    bisoproloL-hydrochlorothiazide 5-6.25 mg oral tablet [TAKE 1 TABLET DAILY]        Objective:        Vitals:         Current: 2020 2:48:53 PM    Ht:  5 ft, 7 in;  Wt: 261.8 lbs;   BMI: 41.0T: 98.1 F (tympanic);  BP: 133/75 mm Hg (left arm, sitting);  P: 79 bpm (left arm (BP Cuff), sitting);  sCr: 0.85 mg/dL;  GFR: 107.12        Exams:     PHYSICAL EXAM:     GENERAL: vital signs recorded - well developed,  moderately obese;  no apparent distress;     EYES: extraocular movements intact; conjunctiva and cornea are normal; PERRL;     NECK: range of motion is normal; thyroid is non-palpable;     RESPIRATORY: normal respiratory rate and pattern with no distress; normal breath sounds with no rales, rhonchi, wheezes or rubs;     CARDIOVASCULAR: normal rate; rhythm is regular;  no systolic murmur;     GASTROINTESTINAL: nontender; normal bowel sounds; no organomegaly;     LYMPHATIC: no enlargement of cervical or facial nodes; no supraclavicular nodes;     BREAST/INTEGUMENT: (there is some edema of the both lower legs that is general - there is more significant edemea of the L lower leg medially just above the ankle with some mild redness - no cord is palpable)     NEUROLOGIC: mental status: alert and oriented x 3; cranial nerves II-XII grossly intact;     PSYCHIATRIC: appropriate affect and demeanor; normal psychomotor function;         Assessment:         M79.605   Pain in left leg       I10   Essential (primary) hypertension       R06.00   Dyspnea, unspecified           ORDERS:         Meds Prescribed:       [New Rx] furosemide 20 mg oral tablet [take 1 tablet (20 mg) by oral route once daily as needed], #30 (thirty) tablets, Refills: 0 (zero)         Radiology/Test Orders:       [id number]  Radiologic exam chest 2 views  (Send-Out)              Lab Orders:       [id number]  COMP - [facility] Comp. Metabolic Panel  (Send-Out)            39886  MG - [facility] Magnesium, Serum  (Send-Out)            75703  TSH - H TSH  (Send-Out)            59930  BDUAM - [facility] Urinalysis, automated, with micro  (Send-Out)            47581  D-DIM - [facility] D-Dimer  (Send-Out)                      Plan:         Pain in left leg         RECOMMENDATIONS given include: I do think this is likely venous insufficiency.  We will evaluate her as noted below.  I'm also a little concerned about this chronic issue with her breathing.  We will await her testing and then consider moving forward with an echocardiogram.  I am going to touch her with some furosemide over the weekend.  I asked that she use compression while working if possible..            Prescriptions:       [New Rx] furosemide 20 mg oral tablet [take 1 tablet (20 mg) by oral route once daily as needed], #30 (thirty) tablets, Refills: 0 (zero)         Essential (primary) hypertension    LABORATORY:  Labs ordered to be performed today include Comprehensive metabolic panel, Magnesium level, TSH, and urinalysis with micro.            Orders:       [id number]  COMP - [facility] Comp. Metabolic Panel  (Send-Out)            75814  MG - [facility] Magnesium, Serum  (Send-Out)            88511  TSH - [facility] TSH  (Send-Out)            35887  BDUAM - [facility] Urinalysis, automated, with micro  (Send-Out)              Dyspnea, unspecified    LABORATORY:  Labs ordered to be performed today include D-Dimer.      RADIOLOGY:  I have ordered a chest x-ray (PA and lateral) to be done today.            Orders:       05339  D-DIM - [facility] D-Dimer  (Send-Out)            50850  Radiologic exam chest 2 views  (Send-Out)                  Charge Capture:         Primary Diagnosis:     M79.605  Pain in left leg           Orders:      60902  Office/outpatient visit; established patient, level 4  (In-House)              I10  Essential (primary) hypertension     R06.00  Dyspnea, unspecified

## 2021-05-25 ENCOUNTER — OFFICE VISIT CONVERTED (OUTPATIENT)
Dept: FAMILY MEDICINE CLINIC | Age: 46
End: 2021-05-25
Attending: NURSE PRACTITIONER

## 2021-06-11 ENCOUNTER — TELEPHONE (OUTPATIENT)
Dept: FAMILY MEDICINE CLINIC | Age: 46
End: 2021-06-11

## 2021-06-11 DIAGNOSIS — L03.116 CELLULITIS OF LEFT LEG: Primary | ICD-10-CM

## 2021-06-11 NOTE — TELEPHONE ENCOUNTER
Caller: Luz Marina Lundy    Relationship: Self    Best call back number: [phone]     What is the best time to reach you: ANYTIME     Who are you requesting to speak with (clinical staff, provider,  specific staff member): CLINICAL STAFF     Do you know the name of the person who called: *LUZ MARINA LUNDY     What was the call regarding: PATIENT IS CALLING TO INFORM  THAT THE MEDICINE SHE PRESCRIBE FOR CELLULITES DIDN'T WORK. SHE IS STILL SWOLLEN AND RED. PLEASE CALL AND ADVISE.     Do you require a callback: YES

## 2021-06-14 PROBLEM — L03.116 CELLULITIS OF LEFT LEG: Status: ACTIVE | Noted: 2021-06-14

## 2021-06-14 NOTE — TELEPHONE ENCOUNTER
I updated pharmacy in chart.  She uses our pharmacy.  She is ok with referral to ID, she prefers [place], but will go to [place] if that is the only choice she has.  Can you put the referral in?

## 2021-06-14 NOTE — TELEPHONE ENCOUNTER
To send in bactrim ds once we know preferred pharmacy.  Also refer to infectious disease to determine cause of persistent cellulitis.

## 2021-06-15 RX ORDER — SULFAMETHOXAZOLE AND TRIMETHOPRIM 800; 160 MG/1; MG/1
1 TABLET ORAL 2 TIMES DAILY
Qty: 20 TABLET | Refills: 0 | Status: SHIPPED | OUTPATIENT
Start: 2021-06-15 | End: 2021-06-30

## 2021-06-15 NOTE — TELEPHONE ENCOUNTER
rx sent.  No infectious disease provider in [place] or [place].    Will need to go to [place].  Referral sent.

## 2021-06-30 ENCOUNTER — OFFICE VISIT (OUTPATIENT)
Dept: INFECTIOUS DISEASES | Facility: CLINIC | Age: 46
End: 2021-06-30

## 2021-06-30 ENCOUNTER — LAB (OUTPATIENT)
Dept: LAB | Facility: HOSPITAL | Age: 46
End: 2021-06-30

## 2021-06-30 VITALS
TEMPERATURE: 97.1 F | DIASTOLIC BLOOD PRESSURE: 81 MMHG | HEART RATE: 71 BPM | WEIGHT: 260 LBS | BODY MASS INDEX: 38.51 KG/M2 | HEIGHT: 69 IN | SYSTOLIC BLOOD PRESSURE: 120 MMHG

## 2021-06-30 DIAGNOSIS — M79.89 LEG SWELLING: ICD-10-CM

## 2021-06-30 DIAGNOSIS — L53.9 LEG ERYTHEMA: ICD-10-CM

## 2021-06-30 DIAGNOSIS — M79.89 LEG SWELLING: Primary | ICD-10-CM

## 2021-06-30 LAB
ALBUMIN SERPL-MCNC: 4 G/DL (ref 3.5–5.2)
ALBUMIN/GLOB SERPL: 1.6 G/DL
ALP SERPL-CCNC: 110 U/L (ref 39–117)
ALT SERPL W P-5'-P-CCNC: 21 U/L (ref 1–33)
ANION GAP SERPL CALCULATED.3IONS-SCNC: 6.6 MMOL/L (ref 5–15)
AST SERPL-CCNC: 16 U/L (ref 1–32)
BASOPHILS # BLD AUTO: 0.05 10*3/MM3 (ref 0–0.2)
BASOPHILS NFR BLD AUTO: 0.6 % (ref 0–1.5)
BILIRUB SERPL-MCNC: 0.7 MG/DL (ref 0–1.2)
BUN SERPL-MCNC: 13 MG/DL (ref 6–20)
BUN/CREAT SERPL: 13.4 (ref 7–25)
CALCIUM SPEC-SCNC: 9.1 MG/DL (ref 8.6–10.5)
CHLORIDE SERPL-SCNC: 105 MMOL/L (ref 98–107)
CO2 SERPL-SCNC: 27.4 MMOL/L (ref 22–29)
CREAT SERPL-MCNC: 0.97 MG/DL (ref 0.57–1)
DEPRECATED RDW RBC AUTO: 46.7 FL (ref 37–54)
EOSINOPHIL # BLD AUTO: 0.08 10*3/MM3 (ref 0–0.4)
EOSINOPHIL NFR BLD AUTO: 1 % (ref 0.3–6.2)
ERYTHROCYTE [DISTWIDTH] IN BLOOD BY AUTOMATED COUNT: 14.6 % (ref 12.3–15.4)
GFR SERPL CREATININE-BSD FRML MDRD: 62 ML/MIN/1.73
GFR SERPL CREATININE-BSD FRML MDRD: 75 ML/MIN/1.73
GLOBULIN UR ELPH-MCNC: 2.5 GM/DL
GLUCOSE SERPL-MCNC: 103 MG/DL (ref 65–99)
HCT VFR BLD AUTO: 39.3 % (ref 34–46.6)
HGB BLD-MCNC: 12.6 G/DL (ref 12–15.9)
IMM GRANULOCYTES # BLD AUTO: 0.01 10*3/MM3 (ref 0–0.05)
IMM GRANULOCYTES NFR BLD AUTO: 0.1 % (ref 0–0.5)
LYMPHOCYTES # BLD AUTO: 2.08 10*3/MM3 (ref 0.7–3.1)
LYMPHOCYTES NFR BLD AUTO: 25.7 % (ref 19.6–45.3)
MCH RBC QN AUTO: 27.7 PG (ref 26.6–33)
MCHC RBC AUTO-ENTMCNC: 32.1 G/DL (ref 31.5–35.7)
MCV RBC AUTO: 86.4 FL (ref 79–97)
MONOCYTES # BLD AUTO: 0.57 10*3/MM3 (ref 0.1–0.9)
MONOCYTES NFR BLD AUTO: 7 % (ref 5–12)
NEUTROPHILS NFR BLD AUTO: 5.3 10*3/MM3 (ref 1.7–7)
NEUTROPHILS NFR BLD AUTO: 65.6 % (ref 42.7–76)
NRBC BLD AUTO-RTO: 0 /100 WBC (ref 0–0.2)
PLATELET # BLD AUTO: 269 10*3/MM3 (ref 140–450)
PMV BLD AUTO: 8.7 FL (ref 6–12)
POTASSIUM SERPL-SCNC: 4.4 MMOL/L (ref 3.5–5.2)
PROT SERPL-MCNC: 6.5 G/DL (ref 6–8.5)
RBC # BLD AUTO: 4.55 10*6/MM3 (ref 3.77–5.28)
SODIUM SERPL-SCNC: 139 MMOL/L (ref 136–145)
WBC # BLD AUTO: 8.09 10*3/MM3 (ref 3.4–10.8)

## 2021-06-30 PROCEDURE — 99204 OFFICE O/P NEW MOD 45 MIN: CPT | Performed by: INTERNAL MEDICINE

## 2021-06-30 PROCEDURE — 85025 COMPLETE CBC W/AUTO DIFF WBC: CPT

## 2021-06-30 PROCEDURE — 80053 COMPREHEN METABOLIC PANEL: CPT

## 2021-06-30 PROCEDURE — 36415 COLL VENOUS BLD VENIPUNCTURE: CPT

## 2021-06-30 RX ORDER — IBUPROFEN 800 MG/1
TABLET ORAL
COMMUNITY
Start: 2021-04-08 | End: 2023-03-15

## 2021-06-30 RX ORDER — FORMOTEROL FUMARATE DIHYD,MICR 100 %
POWDER (GRAM) MISCELLANEOUS
COMMUNITY
End: 2022-03-11

## 2021-06-30 RX ORDER — ALBUTEROL SULFATE 90 UG/1
AEROSOL, METERED RESPIRATORY (INHALATION)
COMMUNITY
Start: 2021-04-08 | End: 2023-03-15 | Stop reason: SINTOL

## 2021-06-30 NOTE — PROGRESS NOTES
Referring Provider: Lindsey Beltran, APRN  [id number] [facility]  [place] [address] [place] [id number]  Reason for clinic visits: Initial infectious disease clinic visit for left lower extremity cellulitis    HPI: Enedelia Lombardi is a 45 y.o. female who Zentz to the infectious disease clinic with above complaint.  The patient states she has been dealing with left lower extremity swelling for quite some time.  She states that she has noticed her left leg swelling more than the right after a long day at work.  Because of this she wears a compression stocking.  1 day after removing her compression stockings she noticed erythema and went to an outside emergency room.  This occurred on April 21.  Left lower extremity Dopplers were negative for DVT.  She was prescribed a course of Keflex.  Her symptoms improved but did not resolve.  She was seen by her primary care provider on May 25 with increasing left lower extremity erythema.  At that time she was given doxycycline 100 mg p.o. twice daily.  Unfortunately this did not improve her erythema and she was switched to Bactrim on June 14.  She has finished the antibiotics a few days ago.  Her leg is much improved.  Some swelling remains there is only a hint of erythema that remains.  During all this the patient denies any fevers chills or night sweats.  She denies any visible wounds or trauma to the left lower extremity.  She has tolerated antibiotics without abdominal pain nausea vomiting or diarrhea.  No rash    PMH  Hypertension  Chronic headaches/migraines  Arthritis  COPD/asthma  She is status post left kidney valve surgery as a young child    Social History   has no history on file for tobacco use, alcohol use, and drug use.    Family History  family history is not on file.    Not on File    The medication list has been reviewed and updated.     Review of Systems  Pertinent items are noted in HPI, all other systems reviewed and negative    Vital Signs   Vitals:    06/30/21  1017   BP: 120/81   Pulse: 71   Temp: 97.1 °F (36.2 °C)       Physical Exam:   General: In no acute distress  HEENT: Normocephalic, atraumatic  Cardiovascular: Normal rate, regular rhythm, agatha S1 and S2, no murmurs, rubs, or gallops    Respiratory: Breathing comfortably on room air   GI: Abdomen is soft, non-tender, non-distended  Skin: No rashes left lower extremity with +1 pitting edema left greater than right, hint of erythema over her shin no wounds  LE: no C/C      Lab Results   Component Value Date    WBC 11.38 (H) 07/27/2020    HGB 12.40 07/27/2020    HCT 37.0 07/27/2020    MCV 84.9 07/27/2020    .00 07/27/2020       Lab Results   Component Value Date    GLUCOSE 110 05/05/2021    BUN 14 04/14/2021    CREATININE 0.93 (H) 04/14/2021    BCR 15 04/14/2021    CO2 21 (L) 04/14/2021    CALCIUM 9.5 04/14/2021    ALBUMIN 3.7 04/14/2021    LABIL2 1.3 (L) 04/14/2021    AST 11 (L) 04/14/2021    ALT 13 04/14/2021       Lab Results   Component Value Date    SEDRATE 15 03/08/2019       Lab Results   Component Value Date    CRP 4.00 01/16/2019       Assessment:  This is a 45 y.o. female who presents to clinic today for evaluation of left lower extremity erythema and swelling.  Sounds like patients problem is stemming from left lower extremity swelling.  Is unclear to me why her left lower extremity swells more than the right.  I would like to obtain lower extremity Dopplers as well as EL testing.  I am wondering if her left sided kidney surgery during her childhood disrupted some of her vasculature resulting in poor lymphatic drainage of the left lower extremity.  This left lower extremity swelling is predisposing the patient to skin damage this could result in infection or dermatitis.  On my physical exam today there is no evidence of infection.  No further antibiotics are indicated at this time.  Her goal is to prevent swelling of the left lower extremity which could lead to inflammation or infection.  I  discussed with the patient the importance of wearing compression stockings at work and keeping her extremity elevated as much as possible.  I asked her to call the infectious disease clinic with any recurrent symptoms    Plan:   1.  Check a CBC with differential and CMP  2.  Obtain bilateral ABIs  3.  Check left lower extremity venous Dopplers    Return to Infectious Disease clinic PRN    Time: More than 50% of time spent in counseling and coordination of care:  Total face-to-face/floor time 50 min.  Time spent in counseling 50 min. Counseling included the following topics: Infection versus inflammation, treatment options, work-up needed

## 2021-07-01 VITALS
SYSTOLIC BLOOD PRESSURE: 121 MMHG | TEMPERATURE: 98.3 F | BODY MASS INDEX: 39.74 KG/M2 | WEIGHT: 253.2 LBS | HEIGHT: 67 IN | HEART RATE: 69 BPM | DIASTOLIC BLOOD PRESSURE: 64 MMHG | OXYGEN SATURATION: 99 %

## 2021-07-01 VITALS
DIASTOLIC BLOOD PRESSURE: 63 MMHG | BODY MASS INDEX: 39.24 KG/M2 | TEMPERATURE: 98.3 F | WEIGHT: 250 LBS | HEIGHT: 67 IN | HEART RATE: 72 BPM | SYSTOLIC BLOOD PRESSURE: 138 MMHG

## 2021-07-01 VITALS
BODY MASS INDEX: 38.74 KG/M2 | HEART RATE: 77 BPM | SYSTOLIC BLOOD PRESSURE: 143 MMHG | TEMPERATURE: 98.3 F | HEIGHT: 67 IN | WEIGHT: 246.8 LBS | DIASTOLIC BLOOD PRESSURE: 73 MMHG

## 2021-07-02 VITALS
HEART RATE: 65 BPM | HEIGHT: 67 IN | SYSTOLIC BLOOD PRESSURE: 130 MMHG | WEIGHT: 260.6 LBS | BODY MASS INDEX: 40.9 KG/M2 | TEMPERATURE: 98 F | DIASTOLIC BLOOD PRESSURE: 68 MMHG

## 2021-07-02 VITALS
TEMPERATURE: 96.9 F | BODY MASS INDEX: 40.53 KG/M2 | WEIGHT: 258.2 LBS | HEIGHT: 67 IN | DIASTOLIC BLOOD PRESSURE: 78 MMHG | SYSTOLIC BLOOD PRESSURE: 136 MMHG | HEART RATE: 81 BPM

## 2021-07-02 VITALS
WEIGHT: 261.8 LBS | HEART RATE: 79 BPM | DIASTOLIC BLOOD PRESSURE: 75 MMHG | HEIGHT: 67 IN | TEMPERATURE: 98.1 F | BODY MASS INDEX: 41.09 KG/M2 | SYSTOLIC BLOOD PRESSURE: 133 MMHG

## 2021-07-02 VITALS
TEMPERATURE: 95.4 F | DIASTOLIC BLOOD PRESSURE: 70 MMHG | HEIGHT: 67 IN | WEIGHT: 265.2 LBS | BODY MASS INDEX: 41.62 KG/M2 | SYSTOLIC BLOOD PRESSURE: 130 MMHG | HEART RATE: 68 BPM

## 2021-07-02 VITALS
SYSTOLIC BLOOD PRESSURE: 126 MMHG | BODY MASS INDEX: 40.37 KG/M2 | HEIGHT: 67 IN | TEMPERATURE: 98.6 F | HEART RATE: 72 BPM | WEIGHT: 257.2 LBS | DIASTOLIC BLOOD PRESSURE: 57 MMHG

## 2021-07-02 VITALS
HEIGHT: 67 IN | HEART RATE: 79 BPM | TEMPERATURE: 97.1 F | SYSTOLIC BLOOD PRESSURE: 134 MMHG | BODY MASS INDEX: 40.18 KG/M2 | DIASTOLIC BLOOD PRESSURE: 73 MMHG | WEIGHT: 256 LBS

## 2021-07-16 ENCOUNTER — HOSPITAL ENCOUNTER (OUTPATIENT)
Dept: CARDIOLOGY | Facility: HOSPITAL | Age: 46
Discharge: HOME | End: 2021-07-16

## 2021-07-16 DIAGNOSIS — L53.9 LEG ERYTHEMA: ICD-10-CM

## 2021-07-16 DIAGNOSIS — M79.89 LEG SWELLING: ICD-10-CM

## 2021-07-16 LAB
BH CV LOWER ARTERIAL LEFT ABI RATIO: 1.07
BH CV LOWER ARTERIAL LEFT DORSALIS PEDIS SYS MAX: 150 MMHG
BH CV LOWER ARTERIAL LEFT GREAT TOE SYS MAX: 144 MMHG
BH CV LOWER ARTERIAL LEFT POST TIBIAL SYS MAX: 158 MMHG
BH CV LOWER ARTERIAL LEFT TBI RATIO: 1.03
BH CV LOWER ARTERIAL RIGHT ABI RATIO: 1.09
BH CV LOWER ARTERIAL RIGHT DORSALIS PEDIS SYS MAX: 141 MMHG
BH CV LOWER ARTERIAL RIGHT GREAT TOE SYS MAX: 155 MMHG
BH CV LOWER ARTERIAL RIGHT POST TIBIAL SYS MAX: 152 MMHG
BH CV LOWER ARTERIAL RIGHT TBI RATIO: 1.11
BH CV LOWER VASCULAR LEFT COMMON FEMORAL AUGMENT: NORMAL
BH CV LOWER VASCULAR LEFT COMMON FEMORAL COMPETENT: NORMAL
BH CV LOWER VASCULAR LEFT COMMON FEMORAL COMPRESS: NORMAL
BH CV LOWER VASCULAR LEFT COMMON FEMORAL PHASIC: NORMAL
BH CV LOWER VASCULAR LEFT COMMON FEMORAL SPONT: NORMAL
BH CV LOWER VASCULAR LEFT DISTAL FEMORAL COMPRESS: NORMAL
BH CV LOWER VASCULAR LEFT GREATER SAPH AK COMPRESS: NORMAL
BH CV LOWER VASCULAR LEFT GREATER SAPH BK COMPRESS: NORMAL
BH CV LOWER VASCULAR LEFT LESSER SAPH COMPRESS: NORMAL
BH CV LOWER VASCULAR LEFT MID FEMORAL AUGMENT: NORMAL
BH CV LOWER VASCULAR LEFT MID FEMORAL COMPETENT: NORMAL
BH CV LOWER VASCULAR LEFT MID FEMORAL COMPRESS: NORMAL
BH CV LOWER VASCULAR LEFT MID FEMORAL PHASIC: NORMAL
BH CV LOWER VASCULAR LEFT MID FEMORAL SPONT: NORMAL
BH CV LOWER VASCULAR LEFT PERONEAL COMPRESS: NORMAL
BH CV LOWER VASCULAR LEFT POPLITEAL AUGMENT: NORMAL
BH CV LOWER VASCULAR LEFT POPLITEAL COMPETENT: NORMAL
BH CV LOWER VASCULAR LEFT POPLITEAL COMPRESS: NORMAL
BH CV LOWER VASCULAR LEFT POPLITEAL PHASIC: NORMAL
BH CV LOWER VASCULAR LEFT POPLITEAL SPONT: NORMAL
BH CV LOWER VASCULAR LEFT POSTERIOR TIBIAL COMPRESS: NORMAL
BH CV LOWER VASCULAR LEFT PROXIMAL FEMORAL COMPRESS: NORMAL
BH CV LOWER VASCULAR LEFT SAPHENOFEMORAL JUNCTION COMPETENT: NORMAL
BH CV LOWER VASCULAR LEFT SAPHENOFEMORAL JUNCTION COMPRESS: NORMAL
BH CV LOWER VASCULAR RIGHT COMMON FEMORAL AUGMENT: NORMAL
BH CV LOWER VASCULAR RIGHT COMMON FEMORAL COMPETENT: NORMAL
BH CV LOWER VASCULAR RIGHT COMMON FEMORAL COMPRESS: NORMAL
BH CV LOWER VASCULAR RIGHT COMMON FEMORAL PHASIC: NORMAL
BH CV LOWER VASCULAR RIGHT COMMON FEMORAL SPONT: NORMAL
MAXIMAL PREDICTED HEART RATE: 173 BPM
MAXIMAL PREDICTED HEART RATE: 173 BPM
STRESS TARGET HR: 147 BPM
STRESS TARGET HR: 147 BPM
UPPER ARTERIAL LEFT ARM BRACHIAL SYS MAX: 140 MMHG
UPPER ARTERIAL RIGHT ARM BRACHIAL SYS MAX: 135 MMHG

## 2021-07-16 PROCEDURE — 93922 UPR/L XTREMITY ART 2 LEVELS: CPT | Performed by: SURGERY

## 2021-07-16 PROCEDURE — 93922 UPR/L XTREMITY ART 2 LEVELS: CPT

## 2021-07-16 PROCEDURE — 93971 EXTREMITY STUDY: CPT | Performed by: SURGERY

## 2021-07-16 PROCEDURE — 93971 EXTREMITY STUDY: CPT

## 2021-07-21 ENCOUNTER — APPOINTMENT (OUTPATIENT)
Dept: CARDIOLOGY | Facility: HOSPITAL | Age: 46
End: 2021-07-21

## 2021-07-30 RX ORDER — THEOPHYLLINE 400 MG/1
400 TABLET, EXTENDED RELEASE ORAL DAILY
Qty: 60 TABLET | Refills: 0 | Status: CANCELLED | OUTPATIENT
Start: 2021-07-30

## 2021-07-31 NOTE — PROGRESS NOTES
Enedelia Lombardi  1975     Office/Outpatient Visit    Visit Date: Tue, May 25, 2021 08:28 am    Provider: Lindsey Beltran N.P. (Assistant: Marilin Romero MA)    Location: [facility]        Electronically signed by Lindsey Beltran N.P. on  05/26/2021 10:03:29 PM                             Subjective:        CC: Mrs. Lombardi is a 45 year old White female.  This is a follow-up visit.  one month, on pain in leg;         HPI: pulmonology august          treated for cellulitis left lower leg per [facility] visit april 21.  venous doppler US left leg negative for DVT.  completed course of keflex.  symptoms improved but did not resolve.  redness left lower leg is increasing again.  afebrile.  denies injury .            pt states she received letter from this office informing her that she missed appt with cardiology.  she was not aware of appointment with cardiology.  does note intermittent palpitation without chest pain about once per week on average.  does also note some dyspnea with exertion that is mildly improved with treatments per pulmonology.      ROS:     CONSTITUTIONAL:  Negative for chills and fever.      CARDIOVASCULAR:  Positive for palpitations and pedal edema ( mild ).   Negative for chest pain.      RESPIRATORY:  Positive for dyspnea ( with moderate exertion; improved but not resolved ).   Negative for recent cough or frequent wheezing.      MUSCULOSKELETAL:  Positive for limb pain ( left leg pain ).      NEUROLOGICAL:  Negative for dizziness, headaches, paresthesias, and weakness.          Past Medical History / Family History / Social History:         Last Reviewed on 5/25/2021 08:39 AM by Lindsey Beltran    Past Medical History: dr china grande is PCP                 PAST MEDICAL HISTORY         Hypertension: dx'd at age age 18s;     Sleep Apnea: dx'd in aug 2020; (+) sleep study;         CURRENT MEDICAL PROVIDERS:    Orthopedist    Rheumatologist         PREVENTIVE HEALTH MAINTENANCE              EYE EXAM: was last done 2019     MAMMOGRAM: was last done 2019 with normal results     PAP SMEAR: was last done 2018 with normal results         Surgical History:     Other Surgeries     section: X 1;     Bilateral Tubal Ligation    kidney reconstruction age 7;         Family History:         Positive for Colon Cancer ( father -- age 65 ) and uterine cancer mother.      Positive for Type 2 Diabetes ( pat. aunt ).          Social History:     Occupation: [employer]     Marital Status:      Children: 1 child         Tobacco/Alcohol/Supplements:     Last Reviewed on 2021 08:31 AM by Marilin Romero    Tobacco: She has a past history of cigarette smoking; quit date:  .          Substance Abuse History:     Last Reviewed on 2020 03:03 PM by Yoni Solorzano        Mental Health History:     Last Reviewed on 2020 03:03 PM by Yoni Solorzano        Communicable Diseases (eg STDs):     Last Reviewed on 2020 03:03 PM by Yoni Solorzano        Current Problems:     Last Reviewed on 2021 08:39 AM by Lindsey Beltran    Essential (primary) hypertension    Gastro-esophageal reflux disease without esophagitis    Pain in left leg    Irregular menstruation, unspecified    Palpitations    Vitamin D deficiency, unspecified    Hyperglycemia, unspecified    Cellulitis of left lower limb        Immunizations:     influenza, injectable, quadrivalent 10/30/2020    SARS-COV-2 (COVID-19) vaccine, UNSPECIFIED 3/13/2021    Havrix -adult dose (HepA) 2019    Hep A, adult dose 2018    Fluzone Quadrivalent (3+ years) 2018    Fluzone Quadrivalent (3+ years) 10/1/2019    Boostrix (Tdap) 2019        Allergies:     Last Reviewed on 2021 01:03 PM by David Sandoval    No Known Allergies.        Current Medications:     Last Reviewed on 2021 08:31 AM by Marilin Romero    bisoproloL-hydrochlorothiazide 10-6.25 mg oral tablet [TAKE 1 TABLET BY MOUTH  EVERY DAY]    MONTELUKAST SOD 10 MG TABLET     SYMBICORT 160-4.5 MCG INHALER     THEOPHYLLINE  MG TABLET         Objective:        Vitals:         Historical:     4/24/2021  BP:   130/68 mm Hg ( (left arm, , sitting, );) 4/24/2021  Wt:   260.6lbs    Current: 5/25/2021 8:32:20 AM    Ht:  5 ft, 7 in;  Wt: 258.2 lbs;  BMI: 40.4T: 96.9 F (temporal);  BP: 143/78 mm Hg (left arm, sitting);  P: 81 bpm (left arm (BP Cuff), sitting);  sCr: 0.93 mg/dL;  GFR: 96.33        Repeat:     9:1:6 AM  BP:   136/78mm Hg (left arm, sitting)     Exams:     PHYSICAL EXAM:     GENERAL: obese;  no apparent distress;     RESPIRATORY: normal respiratory rate and pattern with no distress; normal breath sounds with no rales, rhonchi, wheezes or rubs;     CARDIOVASCULAR: normal rate; rhythm is regular;     Peripheral Pulses: posterior tibial: 2+ amplitude, no bruits; trace pedal edema;     BREAST/INTEGUMENT: cellulitis ( left lower leg medial and anterior.  3 cm area of redness );     MUSCULOSKELETAL:  Normal range of motion, strength and tone;     NEUROLOGIC: mental status: alert and oriented x 3; GROSSLY INTACT     PSYCHIATRIC:  appropriate affect and demeanor; normal speech pattern; grossly normal memory;         Assessment:         L03.116   Cellulitis of left lower limb       R00.2   Palpitations           ORDERS:         Meds Prescribed:       [New Rx] doxycycline monohydrate 100 mg oral tablet [take 1 tablet (100 mg) by oral route 2 times per day], #20 (twenty) tablets, Refills: 0 (zero)         Procedures Ordered:       REFER  Referral to Specialist or Other Facility  (Send-Out)                      Plan:         Cellulitis of left lower limb        RECOMMENDATIONS given include: take with food and not within 2 hrs of milk. may cause photosensitivity-  use sunscreen.  follow up if not improving..            Prescriptions:       [New Rx] doxycycline monohydrate 100 mg oral tablet [take 1 tablet (100 mg) by oral route 2 times per  day], #20 (twenty) tablets, Refills: 0 (zero)         Palpitations        REFERRALS:  Referral initiated to a cardiologist.            Orders:       REFER  Referral to Specialist or Other Facility  (Send-Out)                  Charge Capture:         Primary Diagnosis:     L03.116  Cellulitis of left lower limb           Orders:      72300  Office/outpatient visit; established patient, level 3  (In-House)              R00.2  Palpitations             <<----- Click to add NO significant Past Surgical History

## 2021-08-02 RX ORDER — THEOPHYLLINE 400 MG/1
400 TABLET, EXTENDED RELEASE ORAL DAILY
Qty: 60 TABLET | Refills: 0 | Status: CANCELLED | OUTPATIENT
Start: 2021-07-30

## 2021-08-03 RX ORDER — THEOPHYLLINE 400 MG/1
400 TABLET, EXTENDED RELEASE ORAL DAILY
Qty: 60 TABLET | Refills: 0 | Status: CANCELLED | OUTPATIENT
Start: 2021-07-30

## 2021-08-10 RX ORDER — THEOPHYLLINE 400 MG/1
400 TABLET, EXTENDED RELEASE ORAL DAILY
Qty: 90 TABLET | Refills: 2 | OUTPATIENT
Start: 2021-08-10 | End: 2022-03-11

## 2021-08-12 ENCOUNTER — OFFICE VISIT (OUTPATIENT)
Dept: FAMILY MEDICINE CLINIC | Age: 46
End: 2021-08-12

## 2021-08-12 VITALS
DIASTOLIC BLOOD PRESSURE: 67 MMHG | HEART RATE: 68 BPM | BODY MASS INDEX: 38.24 KG/M2 | TEMPERATURE: 97.5 F | WEIGHT: 258.2 LBS | SYSTOLIC BLOOD PRESSURE: 137 MMHG | HEIGHT: 69 IN

## 2021-08-12 DIAGNOSIS — L03.116 CELLULITIS OF LEFT LEG: Primary | ICD-10-CM

## 2021-08-12 PROCEDURE — 99213 OFFICE O/P EST LOW 20 MIN: CPT | Performed by: NURSE PRACTITIONER

## 2021-08-12 RX ORDER — DOXYCYCLINE 100 MG/1
100 CAPSULE ORAL 2 TIMES DAILY
Qty: 60 CAPSULE | Refills: 0 | Status: SHIPPED | OUTPATIENT
Start: 2021-08-12 | End: 2022-03-11

## 2021-08-12 NOTE — PROGRESS NOTES
"Chief Complaint  Cellulitis (f/u )    Subjective  Enedelia is a 45-year-old female here today for follow-up on what she feels  is a persistent cellulitis of the left lower leg.  Recurrent since April and she has completed 3 courses of antibiotics including Keflex and doxycycline and Bactrim by the end of June.  She has had negative venous Doppler ultrasound of the left leg and bilateral ankle-brachial index test have been normal.  She feels as if the symptoms have never completely resolved and the lower leg is tender to the touch.  She did see Dr. Lili Lamas infectious disease and she recommended wearing compression stockings and following up if concerns persist at the time of that visit on June 30 Dr. Lamas felt as if no infection was present.  Patient does wear her compression stockings daily.  She is afebrile and has tolerated previous antibiotics with no concerns        Enedelia Lombardi presents to [facility]    Review of Systems   Constitutional: Negative.    Respiratory: Negative.    Cardiovascular: Negative.    Skin:        Redness and tenderness of left lower leg   Neurological: Negative.         Objective   Vital Signs:   Vitals:    08/12/21 0907   BP: 137/67   BP Location: Left arm   Patient Position: Sitting   Pulse: 68   Temp: 97.5 °F (36.4 °C)   Weight: 117 kg (258 lb 3.2 oz)   Height: 175.3 cm (69.02\")      Physical Exam  Vitals reviewed.   Constitutional:       General: She is not in acute distress.     Appearance: Normal appearance. She is well-developed. She is obese.   Cardiovascular:      Rate and Rhythm: Normal rate and regular rhythm.      Pulses:           Posterior tibial pulses are 2+ on the right side and 2+ on the left side.      Heart sounds: Normal heart sounds.   Pulmonary:      Effort: Pulmonary effort is normal.      Breath sounds: Normal breath sounds.   Musculoskeletal:      Right lower leg: No edema.      Left lower leg: No edema.   Skin:     General: " Skin is warm and dry.             Comments: Left lower leg is not hot to touch.  There is slight erythema and no edema   Neurological:      General: No focal deficit present.      Mental Status: She is alert.   Psychiatric:         Attention and Perception: Attention normal.         Mood and Affect: Mood and affect normal.         Behavior: Behavior normal.          Result Review :                Assessment and Plan    Diagnoses and all orders for this visit:    1. Cellulitis of left leg (Primary)  Assessment & Plan:  Today's presentation is milder than previous exams.  Suggest longer course of antibiotics.  I do not feel as if she has any current lymphedema but may need to consider possible treatment for lymphedema if symptoms persist.  Reviewed note from infectious disease who recommends following up if persisting concerns.  Warned of possible photosensitivity with doxycycline use sunscreen and take medicine with food to avoid stomach upset      Other orders  -     doxycycline (MONODOX) 100 MG capsule; Take 1 capsule by mouth 2 (Two) Times a Day.  Dispense: 60 capsule; Refill: 0      Follow Up    No follow-ups on file.  Patient was given instructions and counseling regarding her condition or for health maintenance advice. Please see specific information pulled into the AVS if appropriate.   Answers for HPI/ROS submitted by the patient on 8/10/2021  Please describe your symptoms.: Still have some redness and swelling in lower left leg.  Have you had these symptoms before?: Yes  How long have you been having these symptoms?: Greater than 2 weeks  What is the primary reason for your visit?: Other

## 2021-08-12 NOTE — ASSESSMENT & PLAN NOTE
Today's presentation is milder than previous exams.  Suggest longer course of antibiotics.  I do not feel as if she has any current lymphedema but may need to consider possible treatment for lymphedema if symptoms persist.  Reviewed note from infectious disease who recommends following up if persisting concerns.  Warned of possible photosensitivity with doxycycline use sunscreen and take medicine with food to avoid stomach upset

## 2021-09-13 RX ORDER — MONTELUKAST SODIUM 10 MG/1
10 TABLET ORAL EVERY EVENING
Qty: 90 TABLET | Refills: 0 | Status: CANCELLED | OUTPATIENT
Start: 2021-09-13

## 2021-09-15 RX ORDER — MONTELUKAST SODIUM 10 MG/1
10 TABLET ORAL EVERY EVENING
Qty: 90 TABLET | Refills: 0 | Status: CANCELLED | OUTPATIENT
Start: 2021-09-13

## 2021-09-17 RX ORDER — MONTELUKAST SODIUM 10 MG/1
10 TABLET ORAL EVERY EVENING
Qty: 90 TABLET | Refills: 0 | Status: CANCELLED | OUTPATIENT
Start: 2021-09-13

## 2021-09-21 RX ORDER — MONTELUKAST SODIUM 10 MG/1
10 TABLET ORAL EVERY EVENING
Qty: 90 TABLET | Refills: 0 | Status: CANCELLED | OUTPATIENT
Start: 2021-09-13

## 2021-09-23 RX ORDER — MONTELUKAST SODIUM 10 MG/1
10 TABLET ORAL EVERY EVENING
Qty: 90 TABLET | Refills: 0 | Status: CANCELLED | OUTPATIENT
Start: 2021-09-13

## 2021-11-11 RX ORDER — MONTELUKAST SODIUM 10 MG/1
10 TABLET ORAL EVERY EVENING
Qty: 90 TABLET | Refills: 0 | Status: CANCELLED | OUTPATIENT
Start: 2021-11-11

## 2021-11-11 RX ORDER — MONTELUKAST SODIUM 10 MG/1
10 TABLET ORAL EVERY EVENING
Qty: 90 TABLET | Refills: 0 | OUTPATIENT
Start: 2021-11-11

## 2021-11-12 RX ORDER — MONTELUKAST SODIUM 10 MG/1
10 TABLET ORAL EVERY EVENING
Qty: 90 TABLET | Refills: 0 | Status: CANCELLED | OUTPATIENT
Start: 2021-11-11

## 2021-12-14 RX ORDER — BISOPROLOL FUMARATE AND HYDROCHLOROTHIAZIDE 10; 6.25 MG/1; MG/1
1 TABLET ORAL DAILY
Qty: 30 TABLET | Refills: 0 | Status: SHIPPED | OUTPATIENT
Start: 2021-12-14 | End: 2022-01-13 | Stop reason: SDUPTHER

## 2022-01-13 RX ORDER — BISOPROLOL FUMARATE AND HYDROCHLOROTHIAZIDE 10; 6.25 MG/1; MG/1
1 TABLET ORAL DAILY
Qty: 30 TABLET | Refills: 0 | Status: SHIPPED | OUTPATIENT
Start: 2022-01-13 | End: 2022-03-11 | Stop reason: SDUPTHER

## 2022-02-11 RX ORDER — BISOPROLOL FUMARATE AND HYDROCHLOROTHIAZIDE 10; 6.25 MG/1; MG/1
1 TABLET ORAL DAILY
Qty: 30 TABLET | Refills: 0 | OUTPATIENT
Start: 2022-02-11

## 2022-03-11 ENCOUNTER — LAB (OUTPATIENT)
Dept: LAB | Facility: HOSPITAL | Age: 47
End: 2022-03-11

## 2022-03-11 ENCOUNTER — OFFICE VISIT (OUTPATIENT)
Dept: FAMILY MEDICINE CLINIC | Age: 47
End: 2022-03-11

## 2022-03-11 VITALS
WEIGHT: 263 LBS | DIASTOLIC BLOOD PRESSURE: 70 MMHG | SYSTOLIC BLOOD PRESSURE: 114 MMHG | OXYGEN SATURATION: 96 % | HEART RATE: 66 BPM | BODY MASS INDEX: 38.95 KG/M2 | HEIGHT: 69 IN

## 2022-03-11 DIAGNOSIS — J45.20 MILD INTERMITTENT ASTHMA WITHOUT COMPLICATION: ICD-10-CM

## 2022-03-11 DIAGNOSIS — E55.9 VITAMIN D DEFICIENCY: ICD-10-CM

## 2022-03-11 DIAGNOSIS — I10 PRIMARY HYPERTENSION: Primary | ICD-10-CM

## 2022-03-11 DIAGNOSIS — Z13.1 DIABETES MELLITUS SCREENING: ICD-10-CM

## 2022-03-11 DIAGNOSIS — I10 PRIMARY HYPERTENSION: ICD-10-CM

## 2022-03-11 PROBLEM — G47.30 SLEEP APNEA: Status: ACTIVE | Noted: 2022-03-11

## 2022-03-11 PROBLEM — J41.0 SIMPLE CHRONIC BRONCHITIS: Status: ACTIVE | Noted: 2022-03-11

## 2022-03-11 PROBLEM — L03.116 CELLULITIS OF LEFT LEG: Status: RESOLVED | Noted: 2021-06-14 | Resolved: 2022-03-11

## 2022-03-11 LAB
25(OH)D3 SERPL-MCNC: 25.2 NG/ML (ref 30–100)
ALBUMIN SERPL-MCNC: 4.1 G/DL (ref 3.5–5.2)
ALBUMIN/GLOB SERPL: 1.6 G/DL
ALP SERPL-CCNC: 106 U/L (ref 39–117)
ALT SERPL W P-5'-P-CCNC: 12 U/L (ref 1–33)
ANION GAP SERPL CALCULATED.3IONS-SCNC: 9 MMOL/L (ref 5–15)
AST SERPL-CCNC: 10 U/L (ref 1–32)
BILIRUB SERPL-MCNC: 0.6 MG/DL (ref 0–1.2)
BUN SERPL-MCNC: 15 MG/DL (ref 6–20)
BUN/CREAT SERPL: 13.9 (ref 7–25)
CALCIUM SPEC-SCNC: 9.4 MG/DL (ref 8.6–10.5)
CHLORIDE SERPL-SCNC: 108 MMOL/L (ref 98–107)
CHOLEST SERPL-MCNC: 186 MG/DL (ref 0–200)
CO2 SERPL-SCNC: 24 MMOL/L (ref 22–29)
CREAT SERPL-MCNC: 1.08 MG/DL (ref 0.57–1)
EGFRCR SERPLBLD CKD-EPI 2021: 63.5 ML/MIN/1.73
GLOBULIN UR ELPH-MCNC: 2.5 GM/DL
GLUCOSE SERPL-MCNC: 106 MG/DL (ref 65–99)
HBA1C MFR BLD: 6 % (ref 4.8–5.6)
HDLC SERPL-MCNC: 34 MG/DL (ref 40–60)
LDLC SERPL CALC-MCNC: 129 MG/DL (ref 0–100)
LDLC/HDLC SERPL: 3.73 {RATIO}
POTASSIUM SERPL-SCNC: 4.6 MMOL/L (ref 3.5–5.2)
PROT SERPL-MCNC: 6.6 G/DL (ref 6–8.5)
SODIUM SERPL-SCNC: 141 MMOL/L (ref 136–145)
TRIGL SERPL-MCNC: 126 MG/DL (ref 0–150)
VLDLC SERPL-MCNC: 23 MG/DL (ref 5–40)

## 2022-03-11 PROCEDURE — 36415 COLL VENOUS BLD VENIPUNCTURE: CPT

## 2022-03-11 PROCEDURE — 80061 LIPID PANEL: CPT

## 2022-03-11 PROCEDURE — 80053 COMPREHEN METABOLIC PANEL: CPT

## 2022-03-11 PROCEDURE — 99214 OFFICE O/P EST MOD 30 MIN: CPT | Performed by: NURSE PRACTITIONER

## 2022-03-11 PROCEDURE — 83036 HEMOGLOBIN GLYCOSYLATED A1C: CPT

## 2022-03-11 PROCEDURE — 82306 VITAMIN D 25 HYDROXY: CPT

## 2022-03-11 RX ORDER — MONTELUKAST SODIUM 10 MG/1
10 TABLET ORAL EVERY EVENING
Qty: 90 TABLET | Refills: 1 | Status: SHIPPED | OUTPATIENT
Start: 2022-03-11 | End: 2022-09-14 | Stop reason: SDUPTHER

## 2022-03-11 RX ORDER — BISOPROLOL FUMARATE AND HYDROCHLOROTHIAZIDE 10; 6.25 MG/1; MG/1
1 TABLET ORAL DAILY
Qty: 90 TABLET | Refills: 1 | Status: SHIPPED | OUTPATIENT
Start: 2022-03-11 | End: 2022-09-14 | Stop reason: SDUPTHER

## 2022-03-11 NOTE — ASSESSMENT & PLAN NOTE
Monitor blood pressure at home and keep blood pressure log.  Report any uncontrolled blood pressure readings.  Follow-up in 6 months or sooner if concerns.

## 2022-03-11 NOTE — PROGRESS NOTES
"Chief Complaint  Hypertension (Follow up )    Subjective  Patient is a 46-year-old female here today for follow-up on essential hypertension.  She is taking bisoprolol 10 mg with hydrochlorothiazide 6.25 mg once daily.  Denies side effects and requests refills.  Reports blood pressures under good control.    No longer needs follow-up with pulmonologist Dr. Zaid Clarke regarding asthma and COPD.  She states symptoms are stable with use of Singulair on a seasonal basis and Symbicort inhaler.  She is not currently taking Singulair but would like to restart with on coming spring season.    Has never had colon cancer screening.  Father diagnosed with colon cancer in his 60s.  Discussed purpose and benefits of colon cancer screening.  Patient verbalizes understanding and will let me know when she would like a referral for colonoscopy.    History of vitamin D deficiency not currently on a supplement.  Denies fatigue and states she feels good.        Enedelia Lombardi presents to [facility]    Review of Systems   Constitutional: Negative.    HENT: Negative.    Respiratory: Negative.    Cardiovascular: Negative.    Musculoskeletal: Negative.    Neurological: Negative.    Psychiatric/Behavioral: Negative.         Objective   Vital Signs:   Vitals:    03/11/22 0802   BP: 114/70   BP Location: Left arm   Patient Position: Sitting   Cuff Size: Large Adult   Pulse: 66   SpO2: 96%   Weight: 119 kg (263 lb)   Height: 175.3 cm (69\")      Physical Exam  Vitals reviewed.   Constitutional:       General: She is not in acute distress.     Appearance: Normal appearance. She is well-developed. She is obese.   Neck:      Thyroid: No thyroid mass, thyromegaly or thyroid tenderness.   Cardiovascular:      Rate and Rhythm: Normal rate and regular rhythm.      Pulses:           Posterior tibial pulses are 2+ on the right side and 2+ on the left side.      Heart sounds: Normal heart sounds.   Pulmonary:      " Effort: Pulmonary effort is normal.      Breath sounds: Normal breath sounds.   Musculoskeletal:      Right lower leg: No edema.      Left lower leg: No edema.   Skin:     General: Skin is warm and dry.   Neurological:      General: No focal deficit present.      Mental Status: She is alert.   Psychiatric:         Attention and Perception: Attention normal.         Mood and Affect: Mood and affect normal.         Behavior: Behavior normal.          Result Review :                Assessment and Plan    Diagnoses and all orders for this visit:    1. Primary hypertension (Primary)  Assessment & Plan:  Monitor blood pressure at home and keep blood pressure log.  Report any uncontrolled blood pressure readings.  Follow-up in 6 months or sooner if concerns.    Orders:  -     bisoprolol-hydrochlorothiazide (ZIAC) 10-6.25 MG per tablet; Take 1 tablet by mouth Daily. Due for appointment for further refills  Dispense: 90 tablet; Refill: 1  -     Comprehensive metabolic panel; Future  -     Lipid panel; Future    2. Vitamin D deficiency  -     Vitamin D 25 hydroxy; Future    3. Mild intermittent asthma without complication  -     montelukast (SINGULAIR) 10 MG tablet; Take 1 tablet by mouth Every Evening.  Dispense: 90 tablet; Refill: 1    4. Diabetes mellitus screening  -     Hemoglobin A1c; Future      Follow Up    No follow-ups on file.  Patient was given instructions and counseling regarding her condition or for health maintenance advice. Please see specific information pulled into the AVS if appropriate.

## 2022-03-13 NOTE — PROGRESS NOTES
Vitamin d level is mildly low.  I recommend over the counter vitamin d supplement 1,000 IU once daily.  Blood sugar is mildly elevated.  You are not diabetic but are at increased risk of developing diabetes in the future.  Decrease intake of sugar and white breads and pastas.  Increase exercise such as walking to help lower blood sugar and LDL cholesterol.  Kidney function is normal overall but you may benefit from increasing intake of fluids based on creatinine level.  Liver function is normal.

## 2022-09-14 ENCOUNTER — LAB (OUTPATIENT)
Dept: LAB | Facility: HOSPITAL | Age: 47
End: 2022-09-14

## 2022-09-14 ENCOUNTER — OFFICE VISIT (OUTPATIENT)
Dept: FAMILY MEDICINE CLINIC | Age: 47
End: 2022-09-14

## 2022-09-14 VITALS
BODY MASS INDEX: 38.06 KG/M2 | DIASTOLIC BLOOD PRESSURE: 58 MMHG | HEIGHT: 69 IN | SYSTOLIC BLOOD PRESSURE: 111 MMHG | WEIGHT: 257 LBS | OXYGEN SATURATION: 96 % | HEART RATE: 59 BPM

## 2022-09-14 DIAGNOSIS — R60.0 LOCALIZED EDEMA: ICD-10-CM

## 2022-09-14 DIAGNOSIS — R73.03 PREDIABETES: ICD-10-CM

## 2022-09-14 DIAGNOSIS — I10 PRIMARY HYPERTENSION: ICD-10-CM

## 2022-09-14 DIAGNOSIS — R10.2 SUPRAPUBIC PAIN: ICD-10-CM

## 2022-09-14 DIAGNOSIS — E55.9 VITAMIN D DEFICIENCY: ICD-10-CM

## 2022-09-14 DIAGNOSIS — G43.909 MIGRAINE SYNDROME: ICD-10-CM

## 2022-09-14 DIAGNOSIS — J45.20 MILD INTERMITTENT ASTHMA WITHOUT COMPLICATION: ICD-10-CM

## 2022-09-14 DIAGNOSIS — I10 PRIMARY HYPERTENSION: Primary | ICD-10-CM

## 2022-09-14 LAB
25(OH)D3 SERPL-MCNC: 32.7 NG/ML (ref 30–100)
ALBUMIN SERPL-MCNC: 3.9 G/DL (ref 3.5–5.2)
ALBUMIN/GLOB SERPL: 1.6 G/DL
ALP SERPL-CCNC: 91 U/L (ref 39–117)
ALT SERPL W P-5'-P-CCNC: 14 U/L (ref 1–33)
ANION GAP SERPL CALCULATED.3IONS-SCNC: 9.5 MMOL/L (ref 5–15)
AST SERPL-CCNC: 11 U/L (ref 1–32)
BACTERIA UR QL AUTO: ABNORMAL /HPF
BILIRUB SERPL-MCNC: 0.9 MG/DL (ref 0–1.2)
BILIRUB UR QL STRIP: NEGATIVE
BUN SERPL-MCNC: 15 MG/DL (ref 6–20)
BUN/CREAT SERPL: 14.9 (ref 7–25)
CALCIUM SPEC-SCNC: 9.7 MG/DL (ref 8.6–10.5)
CHLORIDE SERPL-SCNC: 106 MMOL/L (ref 98–107)
CHOLEST SERPL-MCNC: 190 MG/DL (ref 0–200)
CLARITY UR: ABNORMAL
CO2 SERPL-SCNC: 24.5 MMOL/L (ref 22–29)
COLOR UR: YELLOW
CREAT SERPL-MCNC: 1.01 MG/DL (ref 0.57–1)
EGFRCR SERPLBLD CKD-EPI 2021: 68.8 ML/MIN/1.73
GLOBULIN UR ELPH-MCNC: 2.4 GM/DL
GLUCOSE SERPL-MCNC: 113 MG/DL (ref 65–99)
GLUCOSE UR STRIP-MCNC: NEGATIVE MG/DL
HBA1C MFR BLD: 5.9 % (ref 4.8–5.6)
HDLC SERPL-MCNC: 32 MG/DL (ref 40–60)
HGB UR QL STRIP.AUTO: ABNORMAL
KETONES UR QL STRIP: NEGATIVE
LDLC SERPL CALC-MCNC: 134 MG/DL (ref 0–100)
LDLC/HDLC SERPL: 4.13 {RATIO}
LEUKOCYTE ESTERASE UR QL STRIP.AUTO: NEGATIVE
MUCOUS THREADS URNS QL MICRO: ABNORMAL /HPF
NITRITE UR QL STRIP: NEGATIVE
PH UR STRIP.AUTO: 5.5 [PH] (ref 5–8)
POTASSIUM SERPL-SCNC: 4.1 MMOL/L (ref 3.5–5.2)
PROT SERPL-MCNC: 6.3 G/DL (ref 6–8.5)
PROT UR QL STRIP: NEGATIVE
RBC # UR STRIP: ABNORMAL /HPF
REF LAB TEST METHOD: ABNORMAL
SODIUM SERPL-SCNC: 140 MMOL/L (ref 136–145)
SP GR UR STRIP: >=1.03 (ref 1–1.03)
SQUAMOUS #/AREA URNS HPF: ABNORMAL /HPF
TRIGL SERPL-MCNC: 130 MG/DL (ref 0–150)
UROBILINOGEN UR QL STRIP: ABNORMAL
VLDLC SERPL-MCNC: 24 MG/DL (ref 5–40)
WBC # UR STRIP: ABNORMAL /HPF
YEAST URNS QL MICRO: ABNORMAL /HPF

## 2022-09-14 PROCEDURE — 80061 LIPID PANEL: CPT

## 2022-09-14 PROCEDURE — 81001 URINALYSIS AUTO W/SCOPE: CPT

## 2022-09-14 PROCEDURE — 99214 OFFICE O/P EST MOD 30 MIN: CPT | Performed by: NURSE PRACTITIONER

## 2022-09-14 PROCEDURE — 80053 COMPREHEN METABOLIC PANEL: CPT

## 2022-09-14 PROCEDURE — 36415 COLL VENOUS BLD VENIPUNCTURE: CPT

## 2022-09-14 PROCEDURE — 87086 URINE CULTURE/COLONY COUNT: CPT

## 2022-09-14 PROCEDURE — 83036 HEMOGLOBIN GLYCOSYLATED A1C: CPT

## 2022-09-14 PROCEDURE — 82306 VITAMIN D 25 HYDROXY: CPT

## 2022-09-14 RX ORDER — BUDESONIDE AND FORMOTEROL FUMARATE DIHYDRATE 160; 4.5 UG/1; UG/1
2 AEROSOL RESPIRATORY (INHALATION)
Qty: 30.6 G | Refills: 5 | Status: SHIPPED | OUTPATIENT
Start: 2022-09-14

## 2022-09-14 RX ORDER — BISOPROLOL FUMARATE AND HYDROCHLOROTHIAZIDE 10; 6.25 MG/1; MG/1
1 TABLET ORAL DAILY
Qty: 90 TABLET | Refills: 1 | Status: SHIPPED | OUTPATIENT
Start: 2022-09-14 | End: 2023-03-15 | Stop reason: SDUPTHER

## 2022-09-14 RX ORDER — MONTELUKAST SODIUM 10 MG/1
10 TABLET ORAL EVERY EVENING
Qty: 90 TABLET | Refills: 1 | Status: SHIPPED | OUTPATIENT
Start: 2022-09-14 | End: 2023-03-15

## 2022-09-14 NOTE — ASSESSMENT & PLAN NOTE
CT of the head was normal in 2017.  Denies symptoms of trigeminal neuralgia.  Follow-up for any persisting or worsening symptoms.

## 2022-09-14 NOTE — ASSESSMENT & PLAN NOTE
Consider abdominal imaging and keep me informed of any abdominal pain that were to occur.  Further treatment pending lab results.  I strongly encouraged her to schedule a Pap smear and a mammogram.

## 2022-09-14 NOTE — PROGRESS NOTES
Chief Complaint  Hypertension (6 month follow up ), Asthma, and Vitamin D Deficiency    Subjective  Patient is a 46-year-old female who is here today to follow-up regarding essential hypertension.  Currently taking bisoprolol 10 mg and hydrochlorothiazide 6.205 mg daily.  Denies side effects and requests refills.  Has a blood pressure cuff at home but does not check blood pressure regularly.  Feels as if blood pressures under good control.    Asthma symptoms can increase with season change she has used her albuterol more often in the last 1 to 2 weeks.  Has not restarted Singulair or Symbicort yet but would like to.  Does not need refills of albuterol.  Does request refills of Singulair and Symbicort.  Denies medication side effects.    Takes tizanidine and diclofenac as needed for headaches.  Has had a couple of headaches about once per month that is more right temporal which is a different location than her usual headaches.  Did have some mild blurred vision briefly that resolved.  She is past due for an eye exam.  She does wear glasses.  She does feel as if she needs new glasses.  No current headache today.  Does not need refills of tizanidine or diclofenac at this time.    History of tubal ligation.  Last menstrual cycle was August 5.  Has had 1 mammogram in 2018 and declines further mammograms at this time.  She cannot recall specifically when her last Pap smear was done but history is negative for abnormal Pap test.    Persist with mild edema of the left lower extremity.  Ankle-brachial index test and venous Doppler ultrasounds were negative last year.  She states she has some mild tenderness to the lower leg consistently if something brushes up against it.  Symptoms are unchanged for more than 1 year.  She denies any current abdominal pain but has had some suprapubic pain intermittently.  She denies any dysuria.        Enedelia BALBINA Lombardi presents to [facility]          Objective  "  Vital Signs:   Vitals:    22 0820   BP: 111/58   BP Location: Left arm   Patient Position: Sitting   Cuff Size: Large Adult   Pulse: 59   SpO2: 96%   Weight: 117 kg (257 lb)   Height: 175.3 cm (69\")      Body mass index is 37.95 kg/m².  Physical Exam  Vitals reviewed.   Constitutional:       General: She is not in acute distress.     Appearance: Normal appearance. She is well-developed. She is obese.   Cardiovascular:      Rate and Rhythm: Normal rate and regular rhythm.      Pulses:           Posterior tibial pulses are 2+ on the right side and 2+ on the left side.      Heart sounds: Normal heart sounds.   Pulmonary:      Effort: Pulmonary effort is normal.      Breath sounds: Normal breath sounds.   Abdominal:      General: Abdomen is flat. Bowel sounds are normal.      Palpations: Abdomen is soft.   Musculoskeletal:      Right lower leg: No edema.      Left lower le+ Edema present.   Skin:     General: Skin is warm and dry.   Neurological:      General: No focal deficit present.      Mental Status: She is alert.   Psychiatric:         Attention and Perception: Attention normal.         Mood and Affect: Mood and affect normal.         Behavior: Behavior normal.          Result Review :     CMP    CMP 3/11/22   Glucose 106 (A)   BUN 15   Creatinine 1.08 (A)   Sodium 141   Potassium 4.6   Chloride 108 (A)   Calcium 9.4   Albumin 4.10   Total Bilirubin 0.6   Alkaline Phosphatase 106   AST (SGOT) 10   ALT (SGPT) 12   (A) Abnormal value                  Lipid Panel    Lipid Panel 3/11/22   Total Cholesterol 186   Triglycerides 126   HDL Cholesterol 34 (A)   VLDL Cholesterol 23   LDL Cholesterol  129 (A)   LDL/HDL Ratio 3.73   (A) Abnormal value                         Assessment and Plan    Diagnoses and all orders for this visit:    1. Primary hypertension (Primary)  Assessment & Plan:  Monitor blood pressure at home and report any abnormal readings.  Follow-up in 6 months or sooner if " concerns.    Orders:  -     Comprehensive metabolic panel; Future  -     Lipid panel; Future  -     bisoprolol-hydrochlorothiazide (ZIAC) 10-6.25 MG per tablet; Take 1 tablet by mouth Daily.  Dispense: 90 tablet; Refill: 1    2. Mild intermittent asthma without complication  -     montelukast (SINGULAIR) 10 MG tablet; Take 1 tablet by mouth Every Evening.  Dispense: 90 tablet; Refill: 1  -     budesonide-formoterol (Symbicort) 160-4.5 MCG/ACT inhaler; Inhale 2 puffs 2 (Two) Times a Day.  Dispense: 30.6 g; Refill: 5    3. Vitamin D deficiency  -     Vitamin D 25 hydroxy; Future    4. Prediabetes  -     Hemoglobin A1c; Future    5. Suprapubic pain  Assessment & Plan:  Consider abdominal imaging and keep me informed of any abdominal pain that were to occur.  Further treatment pending lab results.  I strongly encouraged her to schedule a Pap smear and a mammogram.    Orders:  -     Urinalysis With Culture If Indicated -; Future    6. Migraine syndrome  Assessment & Plan:  CT of the head was normal in 2017.  Denies symptoms of trigeminal neuralgia.  Follow-up for any persisting or worsening symptoms.        7. Localized edema  Assessment & Plan:  Stable.  Consider abdominal imaging if she experiences any abdominal pain.        Follow Up    No follow-ups on file.  Patient was given instructions and counseling regarding her condition or for health maintenance advice. Please see specific information pulled into the AVS if appropriate.

## 2022-09-15 DIAGNOSIS — B37.31 YEAST VAGINITIS: Primary | ICD-10-CM

## 2022-09-15 LAB — BACTERIA SPEC AEROBE CULT: NO GROWTH

## 2022-09-15 RX ORDER — FLUCONAZOLE 150 MG/1
150 TABLET ORAL ONCE
Qty: 1 TABLET | Refills: 0 | Status: SHIPPED | OUTPATIENT
Start: 2022-09-15 | End: 2022-09-17

## 2022-09-15 NOTE — PROGRESS NOTES
Please call patient.  Yeast is noted in her urine and I recommend we treat her for a yeast infection.  I will call in Diflucan to pharmacy of her choice.  Vitamin D level is within normal limits.  Continue current vitamin D supplement.  Lipid panel looks stable overall.  Increase exercise to help lower LDL cholesterol.  Blood sugar is mildly elevated but you do not have diabetes.  Your hemoglobin A1c has decreased slightly to 5.9.  You are at a slight increased risk of developing diabetes in the future.  Increase exercise and decrease intake of sugar.

## 2023-03-15 ENCOUNTER — OFFICE VISIT (OUTPATIENT)
Dept: FAMILY MEDICINE CLINIC | Age: 48
End: 2023-03-15
Payer: COMMERCIAL

## 2023-03-15 ENCOUNTER — HOSPITAL ENCOUNTER (OUTPATIENT)
Dept: GENERAL RADIOLOGY | Facility: HOSPITAL | Age: 48
Discharge: HOME | End: 2023-03-15
Payer: COMMERCIAL

## 2023-03-15 ENCOUNTER — LAB (OUTPATIENT)
Dept: LAB | Facility: HOSPITAL | Age: 48
End: 2023-03-15
Payer: COMMERCIAL

## 2023-03-15 VITALS
HEART RATE: 90 BPM | SYSTOLIC BLOOD PRESSURE: 138 MMHG | DIASTOLIC BLOOD PRESSURE: 87 MMHG | OXYGEN SATURATION: 96 % | BODY MASS INDEX: 38.21 KG/M2 | HEIGHT: 69 IN | WEIGHT: 258 LBS

## 2023-03-15 DIAGNOSIS — I10 PRIMARY HYPERTENSION: ICD-10-CM

## 2023-03-15 DIAGNOSIS — R73.03 PREDIABETES: ICD-10-CM

## 2023-03-15 DIAGNOSIS — M79.642 LEFT HAND PAIN: ICD-10-CM

## 2023-03-15 DIAGNOSIS — Z12.11 COLON CANCER SCREENING: ICD-10-CM

## 2023-03-15 DIAGNOSIS — R53.83 OTHER FATIGUE: ICD-10-CM

## 2023-03-15 DIAGNOSIS — J45.20 MILD INTERMITTENT ASTHMA WITHOUT COMPLICATION: ICD-10-CM

## 2023-03-15 DIAGNOSIS — M79.641 RIGHT HAND PAIN: ICD-10-CM

## 2023-03-15 DIAGNOSIS — G47.00 INSOMNIA, UNSPECIFIED TYPE: ICD-10-CM

## 2023-03-15 DIAGNOSIS — I10 PRIMARY HYPERTENSION: Primary | ICD-10-CM

## 2023-03-15 LAB
ALBUMIN SERPL-MCNC: 4 G/DL (ref 3.5–5.2)
ALBUMIN/GLOB SERPL: 1.5 G/DL
ALP SERPL-CCNC: 112 U/L (ref 39–117)
ALT SERPL W P-5'-P-CCNC: 28 U/L (ref 1–33)
ANION GAP SERPL CALCULATED.3IONS-SCNC: 11 MMOL/L (ref 5–15)
AST SERPL-CCNC: 19 U/L (ref 1–32)
BASOPHILS # BLD AUTO: 0.04 10*3/MM3 (ref 0–0.2)
BASOPHILS NFR BLD AUTO: 0.6 % (ref 0–1.5)
BILIRUB SERPL-MCNC: 1 MG/DL (ref 0–1.2)
BUN SERPL-MCNC: 11 MG/DL (ref 6–20)
BUN/CREAT SERPL: 10.6 (ref 7–25)
CALCIUM SPEC-SCNC: 9.7 MG/DL (ref 8.6–10.5)
CHLORIDE SERPL-SCNC: 105 MMOL/L (ref 98–107)
CHOLEST SERPL-MCNC: 196 MG/DL (ref 0–200)
CO2 SERPL-SCNC: 24 MMOL/L (ref 22–29)
CREAT SERPL-MCNC: 1.04 MG/DL (ref 0.57–1)
DEPRECATED RDW RBC AUTO: 41.8 FL (ref 37–54)
EGFRCR SERPLBLD CKD-EPI 2021: 66 ML/MIN/1.73
EOSINOPHIL # BLD AUTO: 0.18 10*3/MM3 (ref 0–0.4)
EOSINOPHIL NFR BLD AUTO: 2.6 % (ref 0.3–6.2)
ERYTHROCYTE [DISTWIDTH] IN BLOOD BY AUTOMATED COUNT: 13.6 % (ref 12.3–15.4)
GLOBULIN UR ELPH-MCNC: 2.7 GM/DL
GLUCOSE SERPL-MCNC: 103 MG/DL (ref 65–99)
HBA1C MFR BLD: 5.9 % (ref 4.8–5.6)
HCT VFR BLD AUTO: 38.6 % (ref 34–46.6)
HDLC SERPL-MCNC: 33 MG/DL (ref 40–60)
HGB BLD-MCNC: 12.6 G/DL (ref 12–15.9)
IMM GRANULOCYTES # BLD AUTO: 0.01 10*3/MM3 (ref 0–0.05)
IMM GRANULOCYTES NFR BLD AUTO: 0.1 % (ref 0–0.5)
LDLC SERPL CALC-MCNC: 139 MG/DL (ref 0–100)
LDLC/HDLC SERPL: 4.15 {RATIO}
LYMPHOCYTES # BLD AUTO: 2.07 10*3/MM3 (ref 0.7–3.1)
LYMPHOCYTES NFR BLD AUTO: 29.8 % (ref 19.6–45.3)
MCH RBC QN AUTO: 27.6 PG (ref 26.6–33)
MCHC RBC AUTO-ENTMCNC: 32.6 G/DL (ref 31.5–35.7)
MCV RBC AUTO: 84.5 FL (ref 79–97)
MONOCYTES # BLD AUTO: 0.48 10*3/MM3 (ref 0.1–0.9)
MONOCYTES NFR BLD AUTO: 6.9 % (ref 5–12)
NEUTROPHILS NFR BLD AUTO: 4.16 10*3/MM3 (ref 1.7–7)
NEUTROPHILS NFR BLD AUTO: 60 % (ref 42.7–76)
PLATELET # BLD AUTO: 240 10*3/MM3 (ref 140–450)
PMV BLD AUTO: 8.8 FL (ref 6–12)
POTASSIUM SERPL-SCNC: 4.4 MMOL/L (ref 3.5–5.2)
PROT SERPL-MCNC: 6.7 G/DL (ref 6–8.5)
RBC # BLD AUTO: 4.57 10*6/MM3 (ref 3.77–5.28)
SODIUM SERPL-SCNC: 140 MMOL/L (ref 136–145)
TRIGL SERPL-MCNC: 130 MG/DL (ref 0–150)
TSH SERPL DL<=0.05 MIU/L-ACNC: 2.25 UIU/ML (ref 0.27–4.2)
VLDLC SERPL-MCNC: 24 MG/DL (ref 5–40)
WBC NRBC COR # BLD: 6.94 10*3/MM3 (ref 3.4–10.8)

## 2023-03-15 PROCEDURE — 73130 X-RAY EXAM OF HAND: CPT

## 2023-03-15 PROCEDURE — 36415 COLL VENOUS BLD VENIPUNCTURE: CPT

## 2023-03-15 PROCEDURE — 80050 GENERAL HEALTH PANEL: CPT

## 2023-03-15 PROCEDURE — 80061 LIPID PANEL: CPT

## 2023-03-15 PROCEDURE — 83036 HEMOGLOBIN GLYCOSYLATED A1C: CPT

## 2023-03-15 PROCEDURE — 99214 OFFICE O/P EST MOD 30 MIN: CPT | Performed by: NURSE PRACTITIONER

## 2023-03-15 RX ORDER — BISOPROLOL FUMARATE AND HYDROCHLOROTHIAZIDE 10; 6.25 MG/1; MG/1
1 TABLET ORAL DAILY
Qty: 90 TABLET | Refills: 1 | Status: SHIPPED | OUTPATIENT
Start: 2023-03-15

## 2023-03-15 RX ORDER — TRAZODONE HYDROCHLORIDE 50 MG/1
25 TABLET ORAL
Qty: 15 TABLET | Refills: 2 | Status: SHIPPED | OUTPATIENT
Start: 2023-03-15

## 2023-03-15 RX ORDER — MONTELUKAST SODIUM 10 MG/1
10 TABLET ORAL EVERY EVENING
Qty: 90 TABLET | Refills: 1 | Status: SHIPPED | OUTPATIENT
Start: 2023-03-15

## 2023-03-15 RX ORDER — LEVALBUTEROL TARTRATE 45 UG/1
1-2 AEROSOL, METERED ORAL EVERY 4 HOURS PRN
Qty: 15 G | Refills: 3 | Status: SHIPPED | OUTPATIENT
Start: 2023-03-15

## 2023-03-15 NOTE — PROGRESS NOTES
X-ray of the left hand is normal with exception of mild arthritic change.  Sometimes swelling can occur with arthritic change.  Consider referral to hand specialist if symptoms are bothersome.

## 2023-03-15 NOTE — PROGRESS NOTES
Chief Complaint  Annual Exam (Physical ), Hypertension (6 month follow up - Due for colonoscopy ), Vitamin D Deficiency, and Asthma    Subjective          Enedelia Lombardi presents to [facility]     Patient is a 47-year-old female who is here today to follow-up regarding hypertension.  Currently taking bisoprolol 10 mg with hydrochlorothiazide 6.25 mg tablet daily.  Blood pressure is well controlled.  Denies side effects and requests refills.    Patient comes with a list of concerns written by her daughter.  Concerns include mood swings and getting mad easily.  Insomnia, dry skin, left hand swelling over the last 6 weeks and right thumb pain chronic.  Dry skin and varied eating patterns.  Also persists with wheezing and albuterol inhaler always causes thrush.  Currently on Symbicort which does not cause thrush.  Previously did not feel as if Singulair was effective.  Not currently on any antihistamines or nasal steroid sprays.  Did see pulmonologist Dr. Pelaez previously but not recently.  Patient did not care much for Dr. Pelaez.  She did complete a cardiac work-up previously that she reports was negative.  History of tubal ligation.  Gets regular menstrual cycles but frequency may vary and periods tend to be heavier.  Last Pap smear mammogram was in 2018.  Patient states she will consider a well woman exam.    Father with history of colon cancer.  Patient has not yet had colon cancer screening.  She is agreeable to referral to have colon cancer screening done at [facility].    History of sleep apnea but not currently using machine.  She did get a new machine on recall.    Regarding hand pain, had negative rheumatoid work-up by lab work in 2019.  She denies any injuries.         Objective   Vital Signs:   Vitals:    03/15/23 0818   BP: 138/87   BP Location: Left arm   Patient Position: Sitting   Cuff Size: Large Adult   Pulse: 90   SpO2: 96%   Weight: 117 kg (258 lb)   Height: 175.3  "cm (69\")      Body mass index is 38.1 kg/m².  Physical Exam  Vitals reviewed.   Constitutional:       General: She is not in acute distress.     Appearance: Normal appearance. She is well-developed. She is obese.   HENT:      Right Ear: Tympanic membrane normal.      Left Ear: Tympanic membrane normal.      Mouth/Throat:      Mouth: Mucous membranes are moist.      Pharynx: Oropharynx is clear. No oropharyngeal exudate or posterior oropharyngeal erythema.   Neck:      Thyroid: No thyroid mass, thyromegaly or thyroid tenderness.   Cardiovascular:      Rate and Rhythm: Normal rate and regular rhythm.      Heart sounds: Normal heart sounds.   Pulmonary:      Effort: Pulmonary effort is normal.      Breath sounds: Normal breath sounds.   Musculoskeletal:      Right lower leg: No edema.      Left lower leg: No edema.   Skin:     General: Skin is warm and dry.   Neurological:      General: No focal deficit present.      Mental Status: She is alert.   Psychiatric:         Attention and Perception: Attention normal.         Mood and Affect: Mood and affect normal.         Behavior: Behavior normal.           Current Outpatient Medications:   •  bisoprolol-hydrochlorothiazide (ZIAC) 10-6.25 MG per tablet, Take 1 tablet by mouth Daily., Disp: 90 tablet, Rfl: 1  •  budesonide-formoterol (Symbicort) 160-4.5 MCG/ACT inhaler, Inhale 2 puffs 2 (Two) Times a Day., Disp: 30.6 g, Rfl: 5  •  montelukast (SINGULAIR) 10 MG tablet, Take 1 tablet by mouth Every Evening., Disp: 90 tablet, Rfl: 1  •  Spacer/Aero-Holding Chambers (AeroChamber Plus Lee-Vu) misc, use as directed, Disp: 1 each, Rfl: 4  •  levalbuterol (XOPENEX HFA) 45 MCG/ACT inhaler, Inhale 1-2 puffs Every 4 (Four) Hours As Needed for Wheezing., Disp: 15 g, Rfl: 3  •  traZODone (DESYREL) 50 MG tablet, Take 1/2 tablet by mouth every night at bedtime., Disp: 15 tablet, Rfl: 2   Past Medical History:   Diagnosis Date   • Cellulitis of left lower limb    • Essential (primary) " hypertension     DX'D AT AGE 18'S   • GERD without esophagitis    • Hyperglycemia, unspecified    • Irregular menstruation, unspecified    • Palpitations    • Sleep apnea     DX'D IN 8/2020 (+) SLEEP STUDY   • Vitamin D deficiency, unspecified          Result Review :     CMP    CMP 9/14/22 3/15/23   Glucose 113 (A) 103 (A)   BUN 15 11   Creatinine 1.01 (A) 1.04 (A)   eGFR 68.8 66.0   Sodium 140 140   Potassium 4.1 4.4   Chloride 106 105   Calcium 9.7 9.7   Total Protein 6.3 6.7   Albumin 3.90 4.0   Globulin 2.4 2.7   Total Bilirubin 0.9 1.0   Alkaline Phosphatase 91 112   AST (SGOT) 11 19   ALT (SGPT) 14 28   Albumin/Globulin Ratio 1.6 1.5   BUN/Creatinine Ratio 14.9 10.6   Anion Gap 9.5 11.0   (A) Abnormal value       Comments are available for some flowsheets but are not being displayed.           CBC    CBC 3/15/23   WBC 6.94   RBC 4.57   Hemoglobin 12.6   Hematocrit 38.6   MCV 84.5   MCH 27.6   MCHC 32.6   RDW 13.6   Platelets 240             Lipid Panel    Lipid Panel 9/14/22 3/15/23   Total Cholesterol 190 196   Triglycerides 130 130   HDL Cholesterol 32 (A) 33 (A)   VLDL Cholesterol 24 24   LDL Cholesterol  134 (A) 139 (A)   LDL/HDL Ratio 4.13 4.15   (A) Abnormal value            TSH    TSH 3/15/23   TSH 2.250                    Assessment and Plan    Diagnoses and all orders for this visit:    1. Primary hypertension (Primary)  Assessment & Plan:  Monitor blood pressure at home and report any elevated readings.  Further treatment pending lab results.  Follow-up in 1 month to allow adequate attention and follow-up of current symptoms and concerns.    Orders:  -     Comprehensive metabolic panel; Future  -     Lipid panel; Future  -     bisoprolol-hydrochlorothiazide (ZIAC) 10-6.25 MG per tablet; Take 1 tablet by mouth Daily.  Dispense: 90 tablet; Refill: 1    2. Prediabetes  -     Hemoglobin A1c; Future    3. Colon cancer screening  -     Ambulatory Referral to General Surgery    4. Left hand  pain  Assessment & Plan:  Further treatment advice pending x-ray results.  Defers referral to hand specialist at this time.    Orders:  -     XR Hand 3+ View Left; Future    5. Right hand pain  -     XR Hand 3+ View Right; Future    6. Other fatigue  -     TSH; Future  -     CBC w AUTO Differential; Future    7. Mild intermittent asthma without complication  -     montelukast (SINGULAIR) 10 MG tablet; Take 1 tablet by mouth Every Evening.  Dispense: 90 tablet; Refill: 1  -     levalbuterol (XOPENEX HFA) 45 MCG/ACT inhaler; Inhale 1-2 puffs Every 4 (Four) Hours As Needed for Wheezing.  Dispense: 15 g; Refill: 3    8. Insomnia, unspecified type  Assessment & Plan:  Will trial taking trazodone consistently at bedtime every night.  May help mood and sleep.  Follow-up in 1 month or sooner if concerns.    Orders:  -     traZODone (DESYREL) 50 MG tablet; Take 1/2 tablet by mouth every night at bedtime.  Dispense: 15 tablet; Refill: 2      Follow Up    Return in about 4 weeks (around 4/12/2023).  Patient was given instructions and counseling regarding her condition or for health maintenance advice. Please see specific information pulled into the AVS if appropriate.

## 2023-03-15 NOTE — PROGRESS NOTES
X-ray of the right hand is normal.  Consider referral to a hand specialist for further evaluation if symptoms persist.

## 2023-03-16 ENCOUNTER — PRIOR AUTHORIZATION (OUTPATIENT)
Dept: FAMILY MEDICINE CLINIC | Age: 48
End: 2023-03-16
Payer: COMMERCIAL

## 2023-03-21 NOTE — PROGRESS NOTES
You are not anemic.  Hemoglobin a1c, lipid panel, glucose and kidney function are stable.  Thyroid function is normal.

## 2023-03-22 NOTE — ASSESSMENT & PLAN NOTE
Monitor blood pressure at home and report any elevated readings.  Further treatment pending lab results.  Follow-up in 1 month to allow adequate attention and follow-up of current symptoms and concerns.

## 2023-03-22 NOTE — ASSESSMENT & PLAN NOTE
Will trial taking trazodone consistently at bedtime every night.  May help mood and sleep.  Follow-up in 1 month or sooner if concerns.

## 2023-04-13 ENCOUNTER — OFFICE VISIT (OUTPATIENT)
Dept: FAMILY MEDICINE CLINIC | Age: 48
End: 2023-04-13
Payer: COMMERCIAL

## 2023-04-13 VITALS
OXYGEN SATURATION: 98 % | SYSTOLIC BLOOD PRESSURE: 129 MMHG | DIASTOLIC BLOOD PRESSURE: 81 MMHG | BODY MASS INDEX: 37.33 KG/M2 | WEIGHT: 252 LBS | HEART RATE: 65 BPM | HEIGHT: 69 IN

## 2023-04-13 DIAGNOSIS — R22.42 KNEE MASS, LEFT: Primary | ICD-10-CM

## 2023-04-13 DIAGNOSIS — F41.9 ANXIETY AND DEPRESSION: ICD-10-CM

## 2023-04-13 DIAGNOSIS — R22.32 MASS OF LEFT HAND: ICD-10-CM

## 2023-04-13 DIAGNOSIS — D17.21 LIPOMA OF RIGHT UPPER EXTREMITY: ICD-10-CM

## 2023-04-13 DIAGNOSIS — F32.A ANXIETY AND DEPRESSION: ICD-10-CM

## 2023-04-13 DIAGNOSIS — G47.00 INSOMNIA, UNSPECIFIED TYPE: ICD-10-CM

## 2023-04-13 PROBLEM — M79.642 LEFT HAND PAIN: Status: RESOLVED | Noted: 2023-03-15 | Resolved: 2023-04-13

## 2023-04-13 PROBLEM — M25.862 CYST OF LEFT KNEE JOINT: Status: ACTIVE | Noted: 2023-04-13

## 2023-04-13 PROCEDURE — 99214 OFFICE O/P EST MOD 30 MIN: CPT | Performed by: NURSE PRACTITIONER

## 2023-04-13 RX ORDER — ESCITALOPRAM OXALATE 5 MG/1
5 TABLET ORAL DAILY
Qty: 30 TABLET | Refills: 5 | Status: SHIPPED | OUTPATIENT
Start: 2023-04-13

## 2023-04-13 RX ORDER — TRAZODONE HYDROCHLORIDE 50 MG/1
50 TABLET ORAL
Start: 2023-04-13

## 2023-04-13 NOTE — PROGRESS NOTES
"Chief Complaint  Hypertension (4-6 week Follow up ), Prediabetes, and Insomnia    Subjective          Enedelia Lombardi presents to [facility]     Patient is a 47-year-old female who is here today to follow-up regarding mild intermittent asthma.  Due to thrush occurring with use of albuterol, at time of last visit prescription was changed to lev albuterol and prior authorization achieved.  Patient reports using lev albuterol as a rescue inhaler without any concerns of thrush.  Symptoms are improved.  Denies any concerns with thrush related to Symbicort.    Insomnia has improved with use of trazodone 50 mg at bedtime.  She does not feel drowsy the next morning.  Symptoms of mild depression and anxiety have not improved with use of trazodone.    She reports a cystlike area on the left knee that is bothersome.  She would like to see about having this area removed.  She also reports a lipoma on the right arm near her elbow.  This particular area is not bothersome.  Also with a cystlike area on the back of her left hand.     Objective   Vital Signs:   Vitals:    04/13/23 1043   BP: 129/81   BP Location: Left arm   Patient Position: Sitting   Cuff Size: Large Adult   Pulse: 65   SpO2: 98%   Weight: 114 kg (252 lb)   Height: 175.3 cm (69\")      Body mass index is 37.21 kg/m².  Physical Exam  Vitals reviewed.   Constitutional:       General: She is not in acute distress.     Appearance: Normal appearance. She is well-developed.   Cardiovascular:      Rate and Rhythm: Normal rate and regular rhythm.      Heart sounds: Normal heart sounds.   Pulmonary:      Effort: Pulmonary effort is normal.      Breath sounds: Normal breath sounds.   Musculoskeletal:        Arms:       Right lower leg: No edema.      Left lower leg: No edema.        Legs:    Skin:     General: Skin is warm and dry.   Neurological:      General: No focal deficit present.      Mental Status: She is alert.   Psychiatric:         " Attention and Perception: Attention normal.         Mood and Affect: Mood and affect normal.         Behavior: Behavior normal.           Current Outpatient Medications:   •  bisoprolol-hydrochlorothiazide (ZIAC) 10-6.25 MG per tablet, Take 1 tablet by mouth Daily., Disp: 90 tablet, Rfl: 1  •  budesonide-formoterol (Symbicort) 160-4.5 MCG/ACT inhaler, Inhale 2 puffs 2 (Two) Times a Day., Disp: 30.6 g, Rfl: 5  •  levalbuterol (XOPENEX HFA) 45 MCG/ACT inhaler, Inhale 1-2 puffs Every 4 (Four) Hours As Needed for Wheezing., Disp: 15 g, Rfl: 3  •  montelukast (SINGULAIR) 10 MG tablet, Take 1 tablet by mouth Every Evening., Disp: 90 tablet, Rfl: 1  •  Spacer/Aero-Holding Chambers (AeroChamber Plus Lee-Vu) misc, use as directed, Disp: 1 each, Rfl: 4  •  traZODone (DESYREL) 50 MG tablet, Take 1 tablet by mouth every night at bedtime., Disp: , Rfl:   •  escitalopram (Lexapro) 5 MG tablet, Take 1 tablet by mouth Daily., Disp: 30 tablet, Rfl: 5   Past Medical History:   Diagnosis Date   • Cellulitis of left lower limb    • Essential (primary) hypertension     DX'D AT AGE 18'S   • GERD without esophagitis    • Hyperglycemia, unspecified    • Irregular menstruation, unspecified    • Palpitations    • Sleep apnea     DX'D IN 8/2020 (+) SLEEP STUDY   • Vitamin D deficiency, unspecified          Result Review :     CMP        9/14/2022    09:22 3/15/2023    09:45   CMP   Glucose 113   103     BUN 15   11     Creatinine 1.01   1.04     EGFR 68.8   66.0     Sodium 140   140     Potassium 4.1   4.4     Chloride 106   105     Calcium 9.7   9.7     Total Protein 6.3   6.7     Albumin 3.90   4.0     Globulin 2.4   2.7     Total Bilirubin 0.9   1.0     Alkaline Phosphatase 91   112     AST (SGOT) 11   19     ALT (SGPT) 14   28     Albumin/Globulin Ratio 1.6   1.5     BUN/Creatinine Ratio 14.9   10.6     Anion Gap 9.5   11.0       CBC        3/15/2023    09:45   CBC   WBC 6.94     RBC 4.57     Hemoglobin 12.6     Hematocrit 38.6     MCV  84.5     MCH 27.6     MCHC 32.6     RDW 13.6     Platelets 240       CBC w/diff        3/15/2023    09:45   CBC w/Diff   WBC 6.94     RBC 4.57     Hemoglobin 12.6     Hematocrit 38.6     MCV 84.5     MCH 27.6     MCHC 32.6     RDW 13.6     Platelets 240     Neutrophil Rel % 60.0     Immature Granulocyte Rel % 0.1     Lymphocyte Rel % 29.8     Monocyte Rel % 6.9     Eosinophil Rel % 2.6     Basophil Rel % 0.6       Lipid Panel        9/14/2022    09:22 3/15/2023    09:45   Lipid Panel   Total Cholesterol 190   196     Triglycerides 130   130     HDL Cholesterol 32   33     VLDL Cholesterol 24   24     LDL Cholesterol  134   139     LDL/HDL Ratio 4.13   4.15       TSH        3/15/2023    09:45   TSH   TSH 2.250       Most Recent A1C        3/15/2023    09:45   HGBA1C Most Recent   Hemoglobin A1C 5.90                Assessment and Plan    Diagnoses and all orders for this visit:    1. Knee mass, left (Primary)  Assessment & Plan:  I believe referral to orthopedics would be more appropriate than referral to general surgeon.  Referral pending.      Orders:  -     Ambulatory Referral to Orthopedic Surgery    2. Anxiety and depression  Assessment & Plan:  Trazodone does help insomnia but not mood.  Start low-dose Lexapro taken daily.  Follow-up in 1 to 2 months or sooner if concerns.    Orders:  -     escitalopram (Lexapro) 5 MG tablet; Take 1 tablet by mouth Daily.  Dispense: 30 tablet; Refill: 5    3. Insomnia, unspecified type  -     traZODone (DESYREL) 50 MG tablet; Take 1 tablet by mouth every night at bedtime.    4. Lipoma of right upper extremity  Assessment & Plan:  Not bothersome.  Observe.  Refer to surgeon if becomes bothersome      5. Mass of left hand  Assessment & Plan:  Possible ganglion cyst.  Area is not currently bothersome.  Observe.  Refer to hand surgeon if becomes bothersome        Follow Up    No follow-ups on file.  Patient was given instructions and counseling regarding her condition or for health  maintenance advice. Please see specific information pulled into the AVS if appropriate.

## 2023-04-13 NOTE — ASSESSMENT & PLAN NOTE
Trazodone does help insomnia but not mood.  Start low-dose Lexapro taken daily.  Follow-up in 1 to 2 months or sooner if concerns.

## 2023-04-13 NOTE — ASSESSMENT & PLAN NOTE
I believe referral to orthopedics would be more appropriate than referral to general surgeon.  Referral pending.

## 2023-04-13 NOTE — ASSESSMENT & PLAN NOTE
Possible ganglion cyst.  Area is not currently bothersome.  Observe.  Refer to hand surgeon if becomes bothersome

## 2023-04-14 ENCOUNTER — TELEPHONE (OUTPATIENT)
Dept: ORTHOPEDIC SURGERY | Facility: CLINIC | Age: 48
End: 2023-04-14
Payer: COMMERCIAL

## 2023-04-14 NOTE — TELEPHONE ENCOUNTER
Soft, rubbery mass over left patella and knee joint.  Please let me know if better to refer to general surgeon.    INTERNAL REFERRAL FROM [facility] - Knee mass, left - PROG NOTE 4.13.23 - NO IMAGING - NO KNOWN SX - SENDING TO REVIEW FOR DX

## 2023-05-02 ENCOUNTER — TELEPHONE (OUTPATIENT)
Dept: ORTHOPEDIC SURGERY | Facility: CLINIC | Age: 48
End: 2023-05-02
Payer: COMMERCIAL

## 2023-05-02 DIAGNOSIS — M25.562 LEFT KNEE PAIN, UNSPECIFIED CHRONICITY: Primary | ICD-10-CM

## 2023-05-02 NOTE — TELEPHONE ENCOUNTER
PATIENT NOTIFIED TO HAVE LEFT KNEE XRAY PRIOR TO APPOINTMENT WITH DR GIBBS ON Friday 08/05/2023. PATIENT VERBALIZED UNDERSTANDING.

## 2023-05-03 ENCOUNTER — HOSPITAL ENCOUNTER (OUTPATIENT)
Dept: GENERAL RADIOLOGY | Facility: HOSPITAL | Age: 48
Discharge: HOME | End: 2023-05-03
Admitting: STUDENT IN AN ORGANIZED HEALTH CARE EDUCATION/TRAINING PROGRAM
Payer: COMMERCIAL

## 2023-05-03 DIAGNOSIS — M25.562 LEFT KNEE PAIN, UNSPECIFIED CHRONICITY: ICD-10-CM

## 2023-05-03 PROCEDURE — 73564 X-RAY EXAM KNEE 4 OR MORE: CPT

## 2023-05-05 ENCOUNTER — OFFICE VISIT (OUTPATIENT)
Dept: ORTHOPEDIC SURGERY | Facility: CLINIC | Age: 48
End: 2023-05-05
Payer: COMMERCIAL

## 2023-05-05 VITALS — WEIGHT: 253 LBS | BODY MASS INDEX: 37.47 KG/M2 | HEIGHT: 69 IN | HEART RATE: 70 BPM | OXYGEN SATURATION: 96 %

## 2023-05-05 DIAGNOSIS — M76.892 TENDINITIS OF LEFT KNEE: ICD-10-CM

## 2023-05-05 DIAGNOSIS — M70.42 PREPATELLAR BURSITIS OF LEFT KNEE: Primary | ICD-10-CM

## 2023-05-05 NOTE — PROGRESS NOTES
"Chief Complaint  Initial Evaluation and Pain of the Left Knee    Subjective          Enedelia Lombardi presents to [facility] for   History of Present Illness    Enedelia presents today for evaluation of her left knee.  She describes worsening left knee pain predominantly over the anterior and medial aspects.  She denies any injury.  She denies any mechanical symptoms.  No prior injections or knee surgeries.    Allergies   Allergen Reactions   • Albuterol Other (See Comments)     thrush        Social History     Socioeconomic History   • Marital status:    • Number of children: 1   Tobacco Use   • Smoking status: Former     Packs/day: 1.00     Years: 20.00     Pack years: 20.00     Types: Cigarettes     Quit date: 2007     Years since quittin.2   • Smokeless tobacco: Never   Vaping Use   • Vaping Use: Never used   Substance and Sexual Activity   • Alcohol use: Not Currently   • Drug use: Never   • Sexual activity: Defer        I reviewed the patient's chief complaint, history of present illness, review of systems, past medical history, surgical history, family history, social history, medications, and allergy list.     REVIEW OF SYSTEMS    Constitutional: Denies fevers, chills, weight loss  Cardiovascular: Denies chest pain, shortness of breath  Skin: Denies rashes, acute skin changes  Neurologic: Denies headache, loss of consciousness  MSK: Left knee pain      Objective   Vital Signs:   Pulse 70   Ht 175.3 cm (69\")   Wt 115 kg (253 lb)   SpO2 96%   BMI 37.36 kg/m²     Body mass index is 37.36 kg/m².    Physical Exam    General: Alert. No acute distress.   Left lower extremity: No wounds or effusion.  Full extension, 120 degrees of flexion.  Knee stable to varus and valgus stress.  No crepitus with motion.  Tenderness over the prepatellar bursa with mild swelling although no palpable fluid collections are appreciated.  Tenderness over the pes bursa medially.  No joint " line tenderness.  No pain with Marcelo.  Knee stable to ligamentous stress.  Calf soft.  Distal neurovascular intact.    Procedures    Imaging Results (Most Recent)     None                   Assessment and Plan        XR Knee 4+ View Left    Result Date: 5/3/2023  Narrative: PROCEDURE: XR KNEE 4+ VW LEFT  COMPARISON: None  INDICATIONS: LEFT KNEE PAIN  FINDINGS:  No joint space loss or arthritic change.  No bony abnormality.  No significant fluid in the suprapatellar bursa      Impression:  Unremarkable left knee      ZAHIDA NAGEL MD       Electronically Signed and Approved By: ZAHIDA NAGEL MD on 5/03/2023 at 13:43                Diagnoses and all orders for this visit:    1. Prepatellar bursitis of left knee (Primary)  -     Diclofenac Sodium (VOLTAREN) 1 % gel gel; Apply 4 g topically to the appropriate area as directed 4 (Four) Times a Day.  Dispense: 150 g; Refill: 1    2. Tendinitis of left knee  -     Diclofenac Sodium (VOLTAREN) 1 % gel gel; Apply 4 g topically to the appropriate area as directed 4 (Four) Times a Day.  Dispense: 150 g; Refill: 1        We reviewed the previously obtained x-rays.  No significant arthropathy noted.  Her pain seems to be coming from the prepatellar bursa and pes tendons.  We discussed conservative management.  We discussed oral and topical anti-inflammatory medications.  A prescription for Voltaren gel was provided today.  We discussed light, low impact, aerobic type activities.  She may follow-up as needed going forward.      Call or return if worsening symptoms.    Scribed for Elías Ventura MD by Hanna Watkins  05/05/2023   08:32 EDT         Follow Up       As needed    Patient was given instructions and counseling regarding her condition or for health maintenance advice. Please see specific information pulled into the AVS if appropriate.       I have personally performed the services described in this document as scribed by the above individual and it is both accurate  and complete.     Elías Ventura MD  05/05/23  08:42 EDT         no skin changes

## 2023-06-06 DIAGNOSIS — G47.00 INSOMNIA, UNSPECIFIED TYPE: ICD-10-CM

## 2023-06-06 RX ORDER — TRAZODONE HYDROCHLORIDE 50 MG/1
50 TABLET ORAL
Qty: 30 TABLET | Refills: 2 | Status: SHIPPED | OUTPATIENT
Start: 2023-06-06

## 2023-06-16 ENCOUNTER — OFFICE VISIT (OUTPATIENT)
Dept: FAMILY MEDICINE CLINIC | Age: 48
End: 2023-06-16
Payer: COMMERCIAL

## 2023-06-16 VITALS
OXYGEN SATURATION: 97 % | WEIGHT: 251 LBS | HEIGHT: 69 IN | SYSTOLIC BLOOD PRESSURE: 125 MMHG | HEART RATE: 56 BPM | BODY MASS INDEX: 37.18 KG/M2 | DIASTOLIC BLOOD PRESSURE: 80 MMHG

## 2023-06-16 DIAGNOSIS — D17.20 LIPOMA OF FOOT: ICD-10-CM

## 2023-06-16 DIAGNOSIS — G47.00 INSOMNIA, UNSPECIFIED TYPE: ICD-10-CM

## 2023-06-16 DIAGNOSIS — F32.A ANXIETY AND DEPRESSION: Primary | ICD-10-CM

## 2023-06-16 DIAGNOSIS — F41.9 ANXIETY AND DEPRESSION: Primary | ICD-10-CM

## 2023-06-16 PROCEDURE — 99213 OFFICE O/P EST LOW 20 MIN: CPT | Performed by: NURSE PRACTITIONER

## 2023-06-16 RX ORDER — TRAZODONE HYDROCHLORIDE 50 MG/1
50 TABLET ORAL
Qty: 90 TABLET | Refills: 0 | Status: SHIPPED | OUTPATIENT
Start: 2023-06-16

## 2023-06-16 RX ORDER — ESCITALOPRAM OXALATE 10 MG/1
10 TABLET ORAL DAILY
Qty: 90 TABLET | Refills: 0 | Status: SHIPPED | OUTPATIENT
Start: 2023-06-16

## 2023-06-16 NOTE — ASSESSMENT & PLAN NOTE
Increase escitalopram to 10 mg daily.  Follow-up if symptoms or not improving within 1 month.  Otherwise follow-up in 90 days.

## 2023-06-16 NOTE — ASSESSMENT & PLAN NOTE
Recommend referral to a podiatrist for  potential removal if area is getting larger or is bothersome.

## 2023-06-16 NOTE — PROGRESS NOTES
"Chief Complaint  Anxiety (1 month follow up ) and Depression    Subjective          Enedelia Lombardi presents to [facility]     Patient is a 47-year-old female who is here today to follow-up regarding anxiety and depression.  Symptoms have shown some improvement on escitalopram 5 mg daily.  Trazodone 50 mg at bedtime continues to help insomnia.  Patient denies side effects of either medication and does feel as if she would benefit from a higher dose of escitalopram.    Patient recently canceled colonoscopy procedure due to being on menstrual cycle.  Patient states she will reschedule colonoscopy.    Patient reports possible lipomas of the left foot and ankle with some intermittent left foot pain.  She denies any injury.  Defers any x-ray imaging at this time.     Objective   Vital Signs:   Vitals:    06/16/23 1034   BP: 125/80   BP Location: Left arm   Patient Position: Sitting   Cuff Size: Large Adult   Pulse: 56   SpO2: 97%   Weight: 114 kg (251 lb)   Height: 175.3 cm (69\")      Body mass index is 37.07 kg/m².  Physical Exam  Vitals reviewed.   Constitutional:       General: She is not in acute distress.     Appearance: Normal appearance. She is well-developed. She is obese.   Cardiovascular:      Rate and Rhythm: Normal rate and regular rhythm.      Heart sounds: Normal heart sounds.   Pulmonary:      Effort: Pulmonary effort is normal.      Breath sounds: Normal breath sounds.   Musculoskeletal:      Right lower leg: No edema.      Left lower leg: No edema.        Feet:    Skin:     General: Skin is warm and dry.   Neurological:      General: No focal deficit present.      Mental Status: She is alert.   Psychiatric:         Attention and Perception: Attention normal.         Mood and Affect: Mood and affect normal.         Behavior: Behavior normal.           Current Outpatient Medications:   •  bisoprolol-hydrochlorothiazide (ZIAC) 10-6.25 MG per tablet, Take 1 tablet by mouth " Daily., Disp: 90 tablet, Rfl: 1  •  budesonide-formoterol (Symbicort) 160-4.5 MCG/ACT inhaler, Inhale 2 puffs 2 (Two) Times a Day., Disp: 30.6 g, Rfl: 5  •  Diclofenac Sodium (VOLTAREN) 1 % gel gel, Apply 4 g topically to the appropriate area as directed 4 (Four) Times a Day., Disp: 150 g, Rfl: 1  •  levalbuterol (XOPENEX HFA) 45 MCG/ACT inhaler, Inhale 1-2 puffs Every 4 (Four) Hours As Needed for Wheezing., Disp: 15 g, Rfl: 3  •  montelukast (SINGULAIR) 10 MG tablet, Take 1 tablet by mouth Every Evening., Disp: 90 tablet, Rfl: 1  •  Spacer/Aero-Holding Chambers (AeroChamber Plus Lee-Vu) misc, use as directed, Disp: 1 each, Rfl: 4  •  traZODone (DESYREL) 50 MG tablet, Take 1 tablet by mouth every night at bedtime., Disp: 90 tablet, Rfl: 0  •  escitalopram (Lexapro) 10 MG tablet, Take 1 tablet by mouth Daily., Disp: 90 tablet, Rfl: 0   Past Medical History:   Diagnosis Date   • Cellulitis of left lower limb    • Essential (primary) hypertension     DX'D AT AGE 18'S   • GERD without esophagitis    • Hyperglycemia, unspecified    • Irregular menstruation, unspecified    • Palpitations    • Sleep apnea     DX'D IN 8/2020 (+) SLEEP STUDY   • Vitamin D deficiency, unspecified          Result Review :     CMP        9/14/2022    09:22 3/15/2023    09:45   CMP   Glucose 113  103    BUN 15  11    Creatinine 1.01  1.04    EGFR 68.8  66.0    Sodium 140  140    Potassium 4.1  4.4    Chloride 106  105    Calcium 9.7  9.7    Total Protein 6.3  6.7    Albumin 3.90  4.0    Globulin 2.4  2.7    Total Bilirubin 0.9  1.0    Alkaline Phosphatase 91  112    AST (SGOT) 11  19    ALT (SGPT) 14  28    Albumin/Globulin Ratio 1.6  1.5    BUN/Creatinine Ratio 14.9  10.6    Anion Gap 9.5  11.0      CBC        3/15/2023    09:45   CBC   WBC 6.94    RBC 4.57    Hemoglobin 12.6    Hematocrit 38.6    MCV 84.5    MCH 27.6    MCHC 32.6    RDW 13.6    Platelets 240      CBC w/diff        3/15/2023    09:45   CBC w/Diff   WBC 6.94    RBC 4.57     Hemoglobin 12.6    Hematocrit 38.6    MCV 84.5    MCH 27.6    MCHC 32.6    RDW 13.6    Platelets 240    Neutrophil Rel % 60.0    Immature Granulocyte Rel % 0.1    Lymphocyte Rel % 29.8    Monocyte Rel % 6.9    Eosinophil Rel % 2.6    Basophil Rel % 0.6      Lipid Panel        9/14/2022    09:22 3/15/2023    09:45   Lipid Panel   Total Cholesterol 190  196    Triglycerides 130  130    HDL Cholesterol 32  33    VLDL Cholesterol 24  24    LDL Cholesterol  134  139    LDL/HDL Ratio 4.13  4.15      TSH        3/15/2023    09:45   TSH   TSH 2.250               Assessment and Plan    Diagnoses and all orders for this visit:    1. Anxiety and depression (Primary)  Assessment & Plan:  Increase escitalopram to 10 mg daily.  Follow-up if symptoms or not improving within 1 month.  Otherwise follow-up in 90 days.    Orders:  -     escitalopram (Lexapro) 10 MG tablet; Take 1 tablet by mouth Daily.  Dispense: 90 tablet; Refill: 0    2. Insomnia, unspecified type  -     traZODone (DESYREL) 50 MG tablet; Take 1 tablet by mouth every night at bedtime.  Dispense: 90 tablet; Refill: 0    3. Lipoma of foot  Assessment & Plan:  Recommend referral to a podiatrist for  potential removal if area is getting larger or is bothersome.        Follow Up    No follow-ups on file.  Patient was given instructions and counseling regarding her condition or for health maintenance advice. Please see specific information pulled into the AVS if appropriate.

## 2023-09-29 DIAGNOSIS — J45.20 MILD INTERMITTENT ASTHMA WITHOUT COMPLICATION: ICD-10-CM

## 2023-10-01 RX ORDER — BUDESONIDE AND FORMOTEROL FUMARATE DIHYDRATE 160; 4.5 UG/1; UG/1
2 AEROSOL RESPIRATORY (INHALATION)
Qty: 30.6 G | Refills: 5 | Status: SHIPPED | OUTPATIENT
Start: 2023-10-01

## 2023-10-03 ENCOUNTER — OFFICE VISIT (OUTPATIENT)
Dept: FAMILY MEDICINE CLINIC | Age: 48
End: 2023-10-03
Payer: COMMERCIAL

## 2023-10-03 ENCOUNTER — HOSPITAL ENCOUNTER (OUTPATIENT)
Dept: GENERAL RADIOLOGY | Facility: HOSPITAL | Age: 48
Discharge: HOME | End: 2023-10-03
Payer: COMMERCIAL

## 2023-10-03 VITALS
WEIGHT: 247 LBS | OXYGEN SATURATION: 97 % | SYSTOLIC BLOOD PRESSURE: 140 MMHG | TEMPERATURE: 98.2 F | HEIGHT: 69 IN | HEART RATE: 85 BPM | DIASTOLIC BLOOD PRESSURE: 87 MMHG | BODY MASS INDEX: 36.58 KG/M2

## 2023-10-03 DIAGNOSIS — R05.2 SUBACUTE COUGH: Primary | ICD-10-CM

## 2023-10-03 DIAGNOSIS — R05.2 SUBACUTE COUGH: ICD-10-CM

## 2023-10-03 DIAGNOSIS — J45.40 MODERATE PERSISTENT ASTHMA WITHOUT COMPLICATION: ICD-10-CM

## 2023-10-03 PROBLEM — J45.30 MILD PERSISTENT ASTHMA WITHOUT COMPLICATION: Status: ACTIVE | Noted: 2022-03-11

## 2023-10-03 LAB
EXPIRATION DATE: NORMAL
FLUAV AG UPPER RESP QL IA.RAPID: NOT DETECTED
FLUBV AG UPPER RESP QL IA.RAPID: NOT DETECTED
INTERNAL CONTROL: NORMAL
Lab: NORMAL
SARS-COV-2 AG UPPER RESP QL IA.RAPID: NOT DETECTED

## 2023-10-03 PROCEDURE — 87428 SARSCOV & INF VIR A&B AG IA: CPT

## 2023-10-03 PROCEDURE — 71046 X-RAY EXAM CHEST 2 VIEWS: CPT

## 2023-10-03 PROCEDURE — 99213 OFFICE O/P EST LOW 20 MIN: CPT

## 2023-10-03 RX ORDER — AMOXICILLIN AND CLAVULANATE POTASSIUM 875; 125 MG/1; MG/1
1 TABLET, FILM COATED ORAL EVERY 12 HOURS SCHEDULED
COMMUNITY
Start: 2023-09-23

## 2023-10-03 NOTE — PROGRESS NOTES
"Subjective     CHIEF COMPLAINT    Chief Complaint   Patient presents with    Shortness of Breath     X 6 weeks     Cough     X 6 weeks, whole household has been diagnosed with RSV     History of Present Illness  Patient is a 47-year-old female who presents to the clinic today with complaints of cough and shortness of breath.  Symptoms been present for about 6 weeks.  She states she has [facility] to [facility] in that timeframe.  On August 28 she was diagnosed with strep throat and provided with an antibiotic.  [facility] then [facility] to [facility] September and received steroids and antibiotics.  She states she did have a chest x-[facility] done at [facility] on 20 August that was normal.  Patient reports she felt fine when she was on the steroids but once stopping those has worsened again.  She has a history of asthma and is using Symbicort.  She also has Xopenex which she has been using \"several times per day.\"  She says today has been the best day that she has had.  She describes her shortness of breath as feeling like air does not go to the bottom of her lungs.  She is on montelukast at night but not taking anything else for allergies.  She has never been tested for the flu but COVID and strep have been negative at [facility].  She states that her family was diagnosed with RSV but is unsure how they were diagnosed with this.    Review of Systems   Constitutional:  Negative for chills and fever.   HENT:  Positive for congestion. Negative for rhinorrhea.    Respiratory:  Positive for cough, shortness of breath and wheezing.    Cardiovascular:  Positive for leg swelling (left leg, chronic).   Gastrointestinal:  Negative for nausea.   Neurological:  Positive for headaches (intermittent).       Allergies   Allergen Reactions    Albuterol Other (See Comments)     thrush       Current Outpatient Medications on File Prior to Visit   Medication Sig Dispense Refill    amoxicillin-clavulanate (AUGMENTIN) 875-125 MG per tablet Take 1 " "tablet by mouth Every 12 (Twelve) Hours.      bisoprolol-hydrochlorothiazide (ZIAC) 10-6.25 MG per tablet Take 1 tablet by mouth Daily. 90 tablet 1    budesonide-formoterol (Symbicort) 160-4.5 MCG/ACT inhaler Inhale 2 puffs 2 (Two) Times a Day. 30.6 g 5    Diclofenac Sodium (VOLTAREN) 1 % gel gel Apply 4 g topically to the appropriate area as directed 4 (Four) Times a Day. 150 g 1    escitalopram (Lexapro) 10 MG tablet Take 1 tablet by mouth Daily. 90 tablet 0    levalbuterol (XOPENEX HFA) 45 MCG/ACT inhaler Inhale 1-2 puffs Every 4 (Four) Hours As Needed for Wheezing. 15 g 3    montelukast (SINGULAIR) 10 MG tablet Take 1 tablet by mouth Every Evening. 90 tablet 1    Spacer/Aero-Holding Chambers (AeroChamber Plus Lee-Vu) misc use as directed 1 each 4    traZODone (DESYREL) 50 MG tablet Take 1 tablet by mouth every night at bedtime. 90 tablet 0     No current facility-administered medications on file prior to visit.     /87 (BP Location: Right arm, Patient Position: Sitting, Cuff Size: Large Adult)   Pulse 85   Temp 98.2 °F (36.8 °C) (Oral)   Ht 175.3 cm (69.02\")   Wt 112 kg (247 lb)   SpO2 97%   BMI 36.46 kg/m²     Objective     Physical Exam  Vitals and nursing note reviewed.   Constitutional:       General: She is not in acute distress.     Appearance: Normal appearance. She is not ill-appearing.   HENT:      Head: Normocephalic.      Right Ear: Tympanic membrane, ear canal and external ear normal.      Left Ear: Tympanic membrane, ear canal and external ear normal.      Nose: Nose normal.      Right Sinus: No maxillary sinus tenderness or frontal sinus tenderness.      Left Sinus: No maxillary sinus tenderness or frontal sinus tenderness.      Mouth/Throat:      Lips: Pink.      Mouth: Mucous membranes are moist.      Pharynx: Oropharynx is clear. Uvula midline. No pharyngeal swelling, oropharyngeal exudate, posterior oropharyngeal erythema or uvula swelling.   Eyes:      Pupils: Pupils are equal, " round, and reactive to light.   Cardiovascular:      Rate and Rhythm: Normal rate and regular rhythm.      Heart sounds: Normal heart sounds. No murmur heard.  Pulmonary:      Effort: Pulmonary effort is normal. No accessory muscle usage or respiratory distress.      Breath sounds: Normal breath sounds. No wheezing or rhonchi.   Musculoskeletal:      Cervical back: Normal range of motion.   Lymphadenopathy:      Cervical: No cervical adenopathy.   Skin:     General: Skin is warm and dry.   Neurological:      General: No focal deficit present.      Mental Status: She is alert and oriented to person, place, and time.   Psychiatric:         Mood and Affect: Mood and affect normal.         Behavior: Behavior normal.          Results for orders placed or performed in visit on 10/03/23   POCT SARS-CoV-2 Antigen IRENA + Flu    Specimen: Swab   Result Value Ref Range    SARS Antigen Not Detected Not Detected, Presumptive Negative    Influenza A Antigen IRENA Not Detected Not Detected    Influenza B Antigen IRENA Not Detected Not Detected    Internal Control Passed Passed    Lot Number [id number]     Expiration Date 8/7/24        XR Chest PA & Lateral    Result Date: 10/3/2023  PROCEDURE: XR CHEST PA AND LATERAL  COMPARISON: [facility], CHEST PA/AP & LAT 2V, 5/28/2020, 15:45.  INDICATIONS: COUGH / SHORT OF BREATH / WHEEZING X 6 WEEKS  FINDINGS:  The heart size is normal and the lungs are clear       No active disease     Erasto Fairbanks MD       Electronically Signed and Approved By: Erasto Fairbanks MD on 10/03/2023 at 12:23                    Diagnoses and all orders for this visit:    1. Subacute cough (Primary)  -     POCT SARS-CoV-2 Antigen IRENA + Flu  -     XR Chest PA & Lateral; Future    2. Moderate persistent asthma without complication  -     Ambulatory Referral to Pulmonology      Patient is negative for COVID and flu on rapid testing today.  Lungs sound clear on exam and vital signs are stable.  I do not  hear any wheezing.  We will check a chest x-ray just to rule out any acute concerns.  At this time, I do not feel like any additional antibiotics are warranted but pending chest x-ray results, can consider.  She also just recently finished oral steroids.  Continue with Symbicort routinely and Xopenex as needed.  I will go ahead and send in a referral to pulmonology for further management of her asthma.  She is agreeable to this.  Return and ER precautions advised.    Addendum: Chest x-ray is clear and shows no active disease.  Discussed this case with on-call provider, Dr. Lock.  At this time, recommend no changes to her inhaler regimen.  She should follow-up with pulmonology and her PCP.  She has already been educated on strict return and ER precautions and she voiced understanding.       Follow up:  Return if symptoms worsen or fail to improve.  Patient was given instructions and counseling regarding her condition or for health maintenance advice. Please see specific information pulled into the AVS if appropriate.

## 2023-10-06 DIAGNOSIS — J45.40 MODERATE PERSISTENT ASTHMA WITHOUT COMPLICATION: Primary | ICD-10-CM

## 2023-10-17 ENCOUNTER — TRANSCRIBE ORDERS (OUTPATIENT)
Dept: ADMINISTRATIVE | Facility: HOSPITAL | Age: 48
End: 2023-10-17
Payer: COMMERCIAL

## 2023-10-17 DIAGNOSIS — Z12.31 BREAST CANCER SCREENING BY MAMMOGRAM: Primary | ICD-10-CM

## 2023-11-02 ENCOUNTER — HOSPITAL ENCOUNTER (OUTPATIENT)
Dept: MAMMOGRAPHY | Facility: HOSPITAL | Age: 48
Discharge: HOME | End: 2023-11-02
Admitting: PHYSICIAN ASSISTANT
Payer: COMMERCIAL

## 2023-11-02 DIAGNOSIS — Z12.31 BREAST CANCER SCREENING BY MAMMOGRAM: ICD-10-CM

## 2023-11-02 PROCEDURE — 77063 BREAST TOMOSYNTHESIS BI: CPT

## 2023-11-02 PROCEDURE — 77067 SCR MAMMO BI INCL CAD: CPT

## 2023-11-28 DIAGNOSIS — J45.20 MILD INTERMITTENT ASTHMA WITHOUT COMPLICATION: ICD-10-CM

## 2023-11-28 RX ORDER — MONTELUKAST SODIUM 10 MG/1
10 TABLET ORAL EVERY EVENING
Qty: 90 TABLET | Refills: 1 | Status: SHIPPED | OUTPATIENT
Start: 2023-11-28

## 2024-01-08 ENCOUNTER — HOSPITAL ENCOUNTER (OUTPATIENT)
Dept: GENERAL RADIOLOGY | Facility: HOSPITAL | Age: 49
Discharge: HOME | End: 2024-01-08
Admitting: ALLERGY & IMMUNOLOGY
Payer: COMMERCIAL

## 2024-01-08 ENCOUNTER — TRANSCRIBE ORDERS (OUTPATIENT)
Dept: GENERAL RADIOLOGY | Facility: HOSPITAL | Age: 49
End: 2024-01-08
Payer: COMMERCIAL

## 2024-01-08 DIAGNOSIS — J30.1 ALLERGIC RHINITIS DUE TO POLLEN, UNSPECIFIED SEASONALITY: ICD-10-CM

## 2024-01-08 DIAGNOSIS — J30.89 PERENNIAL ALLERGIC RHINITIS: ICD-10-CM

## 2024-01-08 DIAGNOSIS — H10.13 ALLERGIC CONJUNCTIVITIS OF BOTH EYES: ICD-10-CM

## 2024-01-08 DIAGNOSIS — J45.50 ASTHMA, SEVERE PERSISTENT, WELL-CONTROLLED: ICD-10-CM

## 2024-01-08 DIAGNOSIS — J45.50 ASTHMA, SEVERE PERSISTENT, WELL-CONTROLLED: Primary | ICD-10-CM

## 2024-01-08 PROCEDURE — 71046 X-RAY EXAM CHEST 2 VIEWS: CPT

## 2024-04-22 ENCOUNTER — TRANSCRIBE ORDERS (OUTPATIENT)
Dept: ADMINISTRATIVE | Facility: HOSPITAL | Age: 49
End: 2024-04-22
Payer: COMMERCIAL

## 2024-04-22 DIAGNOSIS — Z12.2 SCREENING FOR LUNG CANCER: Primary | ICD-10-CM

## 2024-04-22 DIAGNOSIS — Z87.891 FORMER SMOKER: ICD-10-CM

## 2024-04-29 ENCOUNTER — HOSPITAL ENCOUNTER (OUTPATIENT)
Dept: CT IMAGING | Facility: HOSPITAL | Age: 49
Discharge: HOME | End: 2024-04-29
Admitting: NURSE PRACTITIONER
Payer: COMMERCIAL

## 2024-04-29 DIAGNOSIS — Z12.2 SCREENING FOR LUNG CANCER: ICD-10-CM

## 2024-04-29 DIAGNOSIS — Z87.891 FORMER SMOKER: ICD-10-CM

## 2024-04-29 PROCEDURE — 71271 CT THORAX LUNG CANCER SCR C-: CPT

## 2024-12-13 DIAGNOSIS — J45.20 MILD INTERMITTENT ASTHMA WITHOUT COMPLICATION: ICD-10-CM

## 2024-12-13 RX ORDER — LEVALBUTEROL TARTRATE 45 UG/1
1-2 AEROSOL, METERED ORAL EVERY 4 HOURS PRN
Qty: 15 G | Refills: 0 | OUTPATIENT
Start: 2024-12-13